# Patient Record
Sex: FEMALE | Race: WHITE | NOT HISPANIC OR LATINO | Employment: FULL TIME | URBAN - METROPOLITAN AREA
[De-identification: names, ages, dates, MRNs, and addresses within clinical notes are randomized per-mention and may not be internally consistent; named-entity substitution may affect disease eponyms.]

---

## 2017-03-02 ENCOUNTER — OFFICE VISIT (OUTPATIENT)
Dept: INTERNAL MEDICINE | Facility: CLINIC | Age: 27
End: 2017-03-02

## 2017-03-02 VITALS
HEART RATE: 62 BPM | BODY MASS INDEX: 20.24 KG/M2 | HEIGHT: 62 IN | DIASTOLIC BLOOD PRESSURE: 60 MMHG | OXYGEN SATURATION: 98 % | WEIGHT: 110 LBS | SYSTOLIC BLOOD PRESSURE: 98 MMHG

## 2017-03-02 DIAGNOSIS — J45.20 MILD INTERMITTENT ASTHMA WITHOUT COMPLICATION: Primary | ICD-10-CM

## 2017-03-02 PROCEDURE — 99213 OFFICE O/P EST LOW 20 MIN: CPT | Performed by: NURSE PRACTITIONER

## 2017-03-02 RX ORDER — LORATADINE 10 MG/1
10 TABLET ORAL DAILY
COMMUNITY
End: 2020-03-16

## 2017-03-02 RX ORDER — MONTELUKAST SODIUM 10 MG/1
TABLET ORAL
Qty: 30 TABLET | Refills: 11 | Status: SHIPPED | OUTPATIENT
Start: 2017-03-02 | End: 2019-01-28 | Stop reason: SDUPTHER

## 2017-03-02 RX ORDER — ALBUTEROL SULFATE 90 UG/1
AEROSOL, METERED RESPIRATORY (INHALATION)
Qty: 18 G | Refills: 3 | Status: SHIPPED | OUTPATIENT
Start: 2017-03-02 | End: 2017-11-16 | Stop reason: SDUPTHER

## 2017-03-02 NOTE — PROGRESS NOTES
"Subjective  Med Refill (inhalers )      Lillie Dos Santos is a 26 y.o. female.   Allergies   Allergen Reactions   • Amoxicillin    • Penicillins      History of Present Illness      Using rescue inhaler since working out and doing a lot of cardio , needs before and after exercising 3 x week , pt does have a wood burning stove and does notice it bothering her asthma at times  The following portions of the patient's history were reviewed and updated as appropriate: allergies, current medications, past family history, past medical history, past social history, past surgical history and problem list.    Review of Systems   Respiratory: Positive for cough and wheezing.    All other systems reviewed and are negative.      Objective   Physical Exam   Constitutional: She is oriented to person, place, and time. She appears well-developed.   HENT:   Head: Normocephalic.   Eyes: Pupils are equal, round, and reactive to light.   Cardiovascular: Normal rate, regular rhythm and normal heart sounds.    Pulmonary/Chest: Effort normal and breath sounds normal.   Neurological: She is alert and oriented to person, place, and time.   Skin: Skin is warm and dry.   Psychiatric: She has a normal mood and affect. Her behavior is normal. Judgment and thought content normal.     Visit Vitals   • BP 98/60   • Pulse 62   • Ht 62\" (157.5 cm)   • Wt 110 lb (49.9 kg)   • SpO2 98%   • BMI 20.12 kg/m2       Assessment/Plan     Problem List Items Addressed This Visit        Respiratory    Asthma - Primary    Relevant Medications    albuterol (PROAIR HFA) 108 (90 BASE) MCG/ACT inhaler    fluticasone-salmeterol (ADVAIR DISKUS) 500-50 MCG/DOSE DISKUS    montelukast (SINGULAIR) 10 MG tablet        I have added the singular since she does have a wood burning stove , increase advair, we have discussed the goals of the rescue inhaler, she will let me know if still using > 2 week , o/w rtc 9/17 cpe, fasting labs       "

## 2017-11-17 RX ORDER — ALBUTEROL SULFATE 90 UG/1
AEROSOL, METERED RESPIRATORY (INHALATION)
Qty: 8 G | Refills: 0 | Status: SHIPPED | OUTPATIENT
Start: 2017-11-17 | End: 2019-01-28 | Stop reason: SDUPTHER

## 2017-11-17 RX ORDER — ALBUTEROL SULFATE 90 UG/1
AEROSOL, METERED RESPIRATORY (INHALATION)
Qty: 8 G | Refills: 0 | Status: SHIPPED | OUTPATIENT
Start: 2017-11-17 | End: 2017-11-17 | Stop reason: SDUPTHER

## 2018-03-21 DIAGNOSIS — J45.20 MILD INTERMITTENT ASTHMA WITHOUT COMPLICATION: ICD-10-CM

## 2018-06-11 DIAGNOSIS — J45.20 MILD INTERMITTENT ASTHMA WITHOUT COMPLICATION: ICD-10-CM

## 2019-01-28 ENCOUNTER — OFFICE VISIT (OUTPATIENT)
Dept: INTERNAL MEDICINE | Facility: CLINIC | Age: 29
End: 2019-01-28

## 2019-01-28 VITALS
BODY MASS INDEX: 21.9 KG/M2 | HEIGHT: 62 IN | OXYGEN SATURATION: 94 % | DIASTOLIC BLOOD PRESSURE: 80 MMHG | SYSTOLIC BLOOD PRESSURE: 120 MMHG | HEART RATE: 100 BPM | TEMPERATURE: 98 F | WEIGHT: 119 LBS

## 2019-01-28 DIAGNOSIS — J45.20 MILD INTERMITTENT ASTHMA WITHOUT COMPLICATION: Primary | ICD-10-CM

## 2019-01-28 DIAGNOSIS — R30.0 BURNING WITH URINATION: ICD-10-CM

## 2019-01-28 LAB
BILIRUB BLD-MCNC: NEGATIVE MG/DL
CLARITY, POC: CLEAR
COLOR UR: YELLOW
GLUCOSE UR STRIP-MCNC: NEGATIVE MG/DL
KETONES UR QL: NEGATIVE
LEUKOCYTE EST, POC: NEGATIVE
NITRITE UR-MCNC: NEGATIVE MG/ML
PH UR: 6 [PH] (ref 5–8)
PROT UR STRIP-MCNC: ABNORMAL MG/DL
RBC # UR STRIP: NEGATIVE /UL
SP GR UR: 1.01 (ref 1–1.03)
UROBILINOGEN UR QL: NORMAL

## 2019-01-28 PROCEDURE — 99213 OFFICE O/P EST LOW 20 MIN: CPT | Performed by: NURSE PRACTITIONER

## 2019-01-28 RX ORDER — ALBUTEROL SULFATE 90 UG/1
2 AEROSOL, METERED RESPIRATORY (INHALATION) EVERY 6 HOURS PRN
Qty: 1 INHALER | Refills: 2 | Status: SHIPPED | OUTPATIENT
Start: 2019-01-28 | End: 2019-09-27 | Stop reason: SDUPTHER

## 2019-01-28 RX ORDER — MONTELUKAST SODIUM 10 MG/1
TABLET ORAL
Qty: 30 TABLET | Refills: 2 | Status: SHIPPED | OUTPATIENT
Start: 2019-01-28 | End: 2020-03-16

## 2019-01-28 NOTE — PROGRESS NOTES
"Subjective   Lillie Dos Santos is a 28 y.o. female.   Chief Complaint   Patient presents with   • Urinary Tract Infection     x2   • Asthma      History of Present Illness Having problems with asthma.  Using albuterol more than usual She is out of [facility] for 2 months.  Nonsmoker.  Has sinus drainage.  Has popping and cracking of ears.  Has scratchy throat.  Has a nonproductive cough.  Some wheezing.  No chest pain, abdominal pain, nausea vomiting or diarrhea.  She has burning when she urinates.  No blood in urine.  No fever or chills.  No vaginal discharge    The following portions of the patient's history were reviewed and updated as appropriate: allergies, current medications, past family history, past medical history, past social history, past surgical history and problem list.    Current Outpatient Medications:   •  albuterol sulfate HFA (VENTOLIN HFA) 108 (90 Base) MCG/ACT inhaler, Inhale 2 puffs Every 6 (Six) Hours As Needed for Wheezing., Disp: 1 inhaler, Rfl: 2  •  loratadine (CLARITIN) 10 MG tablet, Take 10 mg by mouth Daily., Disp: , Rfl:   •  montelukast (SINGULAIR) 10 MG tablet, One PO daily, Disp: 30 tablet, Rfl: 2  •  Fluticasone Furoate-Vilanterol (BREO ELLIPTA) 100-25 MCG/INH inhaler, Inhale 1 puff Daily., Disp: 30 each, Rfl: 2    Review of Systems Consitutional, HEENT, Respiratory, CV, GI, , Skin, Musculoskeletal, Neuro-mental, Endocrinological, Hematological were reviewed.  Positives were discussed in the HPI, otherwise ROS was negative   /80   Pulse 100   Temp 98 °F (36.7 °C)   Ht 157.5 cm (62\")   Wt 54 kg (119 lb)   SpO2 94%   BMI 21.77 kg/m²     Objective   Allergies   Allergen Reactions   • Amoxicillin    • Penicillins        Physical Exam   Constitutional: She is oriented to person, place, and time. She appears well-developed and well-nourished. No distress.   HENT:   Head: Normocephalic.   Right Ear: External ear normal.   Left Ear: External ear normal.   Mouth/Throat: Oropharynx is " clear and moist.   TMs are dull.  Throat with PND, no exudate.  Nontender over sinuses   Eyes: Right eye exhibits no discharge. Left eye exhibits no discharge. No scleral icterus.   Neck: Neck supple.   Cardiovascular: Normal rate, regular rhythm, normal heart sounds and intact distal pulses. Exam reveals no gallop and no friction rub.   No murmur heard.  Pulmonary/Chest: Effort normal and breath sounds normal. No stridor. No respiratory distress. She has no wheezes.   Lymphadenopathy:     She has no cervical adenopathy.   Neurological: She is alert and oriented to person, place, and time.   Skin: Skin is warm and dry. Capillary refill takes less than 2 seconds.   Nursing note and vitals reviewed.      Procedures    LABS  Results for orders placed or performed in visit on 01/28/19   POC Urinalysis Dipstick, Automated   Result Value Ref Range    Color Yellow Yellow, Straw, Dark Yellow, Teresa    Clarity, UA Clear Clear    Specific Gravity  1.015 1.005 - 1.030    pH, Urine 6.0 5.0 - 8.0    Leukocytes Negative Negative    Nitrite, UA Negative Negative    Protein,  mg/dL (A) Negative mg/dL    Glucose, UA Negative Negative, 1000 mg/dL (3+) mg/dL    Ketones, UA Negative Negative    Urobilinogen, UA Normal Normal    Bilirubin Negative Negative    Blood, UA Negative Negative       Assessment/Plan   Lillie was seen today for urinary tract infection and asthma.    Diagnoses and all orders for this visit:    Mild intermittent asthma without complication  -     albuterol sulfate HFA (VENTOLIN HFA) 108 (90 Base) MCG/ACT inhaler; Inhale 2 puffs Every 6 (Six) Hours As Needed for Wheezing.  -     Fluticasone Furoate-Vilanterol (BREO ELLIPTA) 100-25 MCG/INH inhaler; Inhale 1 puff Daily.  -     montelukast (SINGULAIR) 10 MG tablet; One PO daily    Burning with urination  -     POC Urinalysis Dipstick, Automated    Other orders  -     Cancel: fluticasone-salmeterol (ADVAIR DISKUS) 500-50 MCG/DOSE DISKUS;           Patient  Instructions   Drink plenty fluids.  Urine analysis was negative for infection.  Begin Breo as discussed for asthma.  Labs as know if it means to be preauthorized.  Use albuterol 2 puffs every 6 hours as needed for wheezing.  Do not use it more than every 6 hours and she can have a rebound effect.  Antihistamine of choice.  Return to the clinic, If you are not improving. Pt verbalizes understanding and agreement with plan of care.   EMR Dragon/transcription disclaimer:  Please note that portions of this note were completed with a voice recognition program.  Electronic transcription of the voice recognition program may permit erroneous words or phrases to be inadvertently transcribed.  Although I have reviewed the note for such errors, some may still exist in this documentation     Kae Topete, JENNIFER

## 2019-01-30 NOTE — PATIENT INSTRUCTIONS
Drink plenty fluids.  Urine analysis was negative for infection.  Begin Breo as discussed for asthma.  Labs as know if it means to be preauthorized.  Use albuterol 2 puffs every 6 hours as needed for wheezing.  Do not use it more than every 6 hours and she can have a rebound effect.  Antihistamine of choice.  Return to the clinic, If you are not improving. Pt verbalizes understanding and agreement with plan of care.

## 2019-01-31 DIAGNOSIS — J45.20 MILD INTERMITTENT ASTHMA WITHOUT COMPLICATION: Primary | ICD-10-CM

## 2019-01-31 RX ORDER — BUDESONIDE AND FORMOTEROL FUMARATE DIHYDRATE 160; 4.5 UG/1; UG/1
2 AEROSOL RESPIRATORY (INHALATION)
Qty: 1 INHALER | Refills: 2 | Status: SHIPPED | OUTPATIENT
Start: 2019-01-31 | End: 2020-03-16 | Stop reason: SDUPTHER

## 2019-02-08 ENCOUNTER — PRIOR AUTHORIZATION (OUTPATIENT)
Dept: INTERNAL MEDICINE | Facility: CLINIC | Age: 29
End: 2019-02-08

## 2019-02-26 ENCOUNTER — PRIOR AUTHORIZATION (OUTPATIENT)
Dept: INTERNAL MEDICINE | Facility: CLINIC | Age: 29
End: 2019-02-26

## 2019-08-28 ENCOUNTER — PRIOR AUTHORIZATION (OUTPATIENT)
Dept: INTERNAL MEDICINE | Facility: CLINIC | Age: 29
End: 2019-08-28

## 2019-09-19 ENCOUNTER — TELEPHONE (OUTPATIENT)
Dept: INTERNAL MEDICINE | Facility: CLINIC | Age: 29
End: 2019-09-19

## 2019-09-27 DIAGNOSIS — J45.20 MILD INTERMITTENT ASTHMA WITHOUT COMPLICATION: ICD-10-CM

## 2019-09-27 RX ORDER — ALBUTEROL SULFATE 90 UG/1
2 AEROSOL, METERED RESPIRATORY (INHALATION) EVERY 6 HOURS PRN
Qty: 1 INHALER | Refills: 2 | Status: CANCELLED | OUTPATIENT
Start: 2019-09-27

## 2019-09-27 RX ORDER — ALBUTEROL SULFATE 90 UG/1
2 AEROSOL, METERED RESPIRATORY (INHALATION) EVERY 6 HOURS PRN
Qty: 1 INHALER | Refills: 0 | Status: SHIPPED | OUTPATIENT
Start: 2019-09-27 | End: 2020-03-16 | Stop reason: SDUPTHER

## 2019-09-27 NOTE — TELEPHONE ENCOUNTER
Needs appt as she has not been here since in greater than 6 months.  I will send in 1 month of albuterol

## 2019-09-27 NOTE — TELEPHONE ENCOUNTER
PT CALLED TO SAY THAT THE SYMBICORT INHALER IS NOT COVERED AND SHE HAS BEEN HAVING TO USE THE RESCUE INHALER MORE AND IS NOW OUT OF THIS   CAN WE CALL SOMETHING ELSE IN FOR THE SYMBICORT?    PLEASE CALL [phone]

## 2019-10-02 ENCOUNTER — PRIOR AUTHORIZATION (OUTPATIENT)
Dept: INTERNAL MEDICINE | Facility: CLINIC | Age: 29
End: 2019-10-02

## 2020-02-19 ENCOUNTER — TELEPHONE (OUTPATIENT)
Dept: INTERNAL MEDICINE | Facility: CLINIC | Age: 30
End: 2020-02-19

## 2020-02-19 NOTE — TELEPHONE ENCOUNTER
VMAILBOX FULL, CALLING TO EST CARE WITH A DIFF PROVIDER AS A NEW PT, LAST PROVIDER LE WAYNE IS NO LONGER IN THIS PRACTICE

## 2020-03-13 ENCOUNTER — NURSE TRIAGE (OUTPATIENT)
Dept: CALL CENTER | Facility: HOSPITAL | Age: 30
End: 2020-03-13

## 2020-03-13 NOTE — TELEPHONE ENCOUNTER
Patient is  Requesting to be seen. Patient is out of her inhalers. Has taken a neb treatment. Will be going to urgent care today. Has an appointment with provider 3/16.    Reason for Disposition  • [1] MILD asthma attack (e.g., no SOB at rest, mild SOB with walking, speaks normally in sentences, mild wheezing) AND [2]  persists > 24 hours on appropriate treatment    Additional Information  • Negative: Severe difficulty breathing (e.g., struggling for each breath, speak in single words, pulse > 120)  • Negative: Bluish (or gray) lips or face now  • Negative: Difficult to awaken or acting confused (e.g., disoriented, slurred speech)  • Negative: Passed out (i.e., lost consciousness, collapsed and was not responding)  • Negative: Wheezing started suddenly after medicine, an allergic food, or bee sting  • Negative: Sounds like a life-threatening emergency to the triager  • Negative: [1] SEVERE asthma attack (e.g., very SOB at rest, speaks in single words, loud wheezes) AND [2] not resolved after 2 nebulizer or inhaler treatments  • Negative: Peak flow rate less than 50% of baseline level (RED zone)  • Negative: [1] Severe wheezing or coughing AND [2] doesn't have neb or inhaler available  • Negative: Chest pain  • Negative: [1] Hospitalized before with asthma AND [2] feels the same now  • Negative: [1] MODERATE asthma attack (e.g., SOB at rest, speaks in phrases, audible wheezes) AND [2] not resolved after 2 nebulizer or inhaler treatments given 20 minutes apart  • Negative: [1] Peak flow rate 50-80% of baseline level (YELLOW zone) AND [2] not resolved after 2 nebulizer or inhaler treatments given 20 minutes apart  • Negative: Asthma medicine (nebulizer or inhaler) is needed more frequently than q 4 hours to keep you comfortable  • Negative: Fever > 103 F (39.4 C)  • Negative: [1] Fever > 101 F (38.3 C) AND [2] age > 60  • Negative: Patient sounds very sick or weak to the triager  • Negative: [1] Continuous (nonstop)  "coughing AND [2] keeps from working or sleeping AND [3] not improved after 2 nebulizer or inhaler treatments given 20 minutes apart  • Negative: [1] Wheezing or coughing AND [2] hasn't used neb or inhaler twice AND [3] it's available  • Negative: [1] Influenza prevalent in community (or household) AND [2] flu symptoms (e.g., cough WITH fever, etc) AND [3] onset < 48 hours ago    Answer Assessment - Initial Assessment Questions  1. RESPIRATORY STATUS: \"Describe your breathing?\" (e.g., wheezing, shortness of breath, unable to speak, severe coughing)       Short of air , but has taken a breathing rx  2. ONSET: \"When did this asthma attack begin?\"       3 days  3. TRIGGER: \"What do you think triggered this attack?\" (e.g., URI, exposure to pollen or other allergen, tobacco smoke)       Out of her inhalers  4. PEAK EXPIRATORY FLOW RATE (PEFR): \"Do you use a peak flow meter?\" If so, ask: \"What's the current peak flow? What's your personal best peak flow?\"       na  5. SEVERITY: \"How bad is this attack?\"     - MILD: No SOB at rest, mild SOB with walking, speaks normally in sentences, can lay down, no retractions, pulse < 100. (GREEN Zone: PEFR %)    - MODERATE: SOB at rest, SOB with minimal exertion and prefers to sit, cannot lie down flat, speaks in phrases, mild retractions, audible wheezing, pulse 100-120. (YELLOW Zone: PEFR 50-80%)     - SEVERE: Very SOB at rest, speaks in single words, struggling to breathe, sitting hunched forward, retractions, usually loud wheezing, sometimes minimal wheezing because of decreased air movement, pulse > 120. (RED Zone: PEFR < 50%).       Mild , has just had a breathing treatment  6. MEDICATIONS (Inhaler or nebs): \"What are your asthma medications?\" and \"What treatments have you given so far?\"     - Quick-relief: albuterol, metaproterenol, salbutamol, or other inhaled or nebulized beta-agonist medicines    - Long-term-control: steroids, cromolyn, or other anti-inflammatory " "medicines.      Patient is out of her inhaler  7. OTHER SYMPTOMS: \"Do you have any other symptoms? (e.g., runny nose, chest pain, fever)     no  8. PREGNANCY: \"Is there any chance you are pregnant?\" \"When was your last menstrual period?\"      no    Protocols used: ASTHMA ATTACK-ADULT-AH      "

## 2020-03-16 ENCOUNTER — LAB (OUTPATIENT)
Dept: LAB | Facility: HOSPITAL | Age: 30
End: 2020-03-16

## 2020-03-16 ENCOUNTER — OFFICE VISIT (OUTPATIENT)
Dept: INTERNAL MEDICINE | Facility: CLINIC | Age: 30
End: 2020-03-16

## 2020-03-16 VITALS
BODY MASS INDEX: 22.6 KG/M2 | RESPIRATION RATE: 16 BRPM | TEMPERATURE: 97.7 F | WEIGHT: 122.8 LBS | SYSTOLIC BLOOD PRESSURE: 118 MMHG | DIASTOLIC BLOOD PRESSURE: 64 MMHG | OXYGEN SATURATION: 98 % | HEIGHT: 62 IN | HEART RATE: 101 BPM

## 2020-03-16 DIAGNOSIS — R73.9 HYPERGLYCEMIA: ICD-10-CM

## 2020-03-16 DIAGNOSIS — Z3A.01 LESS THAN 8 WEEKS GESTATION OF PREGNANCY: ICD-10-CM

## 2020-03-16 DIAGNOSIS — Z00.00 HEALTHCARE MAINTENANCE: ICD-10-CM

## 2020-03-16 DIAGNOSIS — J45.21 MILD INTERMITTENT ASTHMA WITH ACUTE EXACERBATION: Primary | ICD-10-CM

## 2020-03-16 LAB
ALBUMIN SERPL-MCNC: 4.7 G/DL (ref 3.5–5.2)
ALBUMIN/GLOB SERPL: 1.8 G/DL
ALP SERPL-CCNC: 45 U/L (ref 39–117)
ALT SERPL W P-5'-P-CCNC: 7 U/L (ref 1–33)
ANION GAP SERPL CALCULATED.3IONS-SCNC: 15.6 MMOL/L (ref 5–15)
AST SERPL-CCNC: 14 U/L (ref 1–32)
BILIRUB SERPL-MCNC: 0.3 MG/DL (ref 0.2–1.2)
BUN BLD-MCNC: 10 MG/DL (ref 6–20)
BUN/CREAT SERPL: 11.9 (ref 7–25)
CALCIUM SPEC-SCNC: 9.8 MG/DL (ref 8.6–10.5)
CHLORIDE SERPL-SCNC: 101 MMOL/L (ref 98–107)
CO2 SERPL-SCNC: 20.4 MMOL/L (ref 22–29)
CREAT BLD-MCNC: 0.84 MG/DL (ref 0.57–1)
DEPRECATED RDW RBC AUTO: 38.7 FL (ref 37–54)
ERYTHROCYTE [DISTWIDTH] IN BLOOD BY AUTOMATED COUNT: 12.2 % (ref 12.3–15.4)
GFR SERPL CREATININE-BSD FRML MDRD: 81 ML/MIN/1.73
GLOBULIN UR ELPH-MCNC: 2.6 GM/DL
GLUCOSE BLD-MCNC: 163 MG/DL (ref 65–99)
HCT VFR BLD AUTO: 36.9 % (ref 34–46.6)
HGB BLD-MCNC: 12.2 G/DL (ref 12–15.9)
MCH RBC QN AUTO: 28.8 PG (ref 26.6–33)
MCHC RBC AUTO-ENTMCNC: 33.1 G/DL (ref 31.5–35.7)
MCV RBC AUTO: 87.2 FL (ref 79–97)
PLATELET # BLD AUTO: 365 10*3/MM3 (ref 140–450)
PMV BLD AUTO: 10 FL (ref 6–12)
POTASSIUM BLD-SCNC: 4.1 MMOL/L (ref 3.5–5.2)
PROT SERPL-MCNC: 7.3 G/DL (ref 6–8.5)
RBC # BLD AUTO: 4.23 10*6/MM3 (ref 3.77–5.28)
SODIUM BLD-SCNC: 137 MMOL/L (ref 136–145)
WBC NRBC COR # BLD: 13.32 10*3/MM3 (ref 3.4–10.8)

## 2020-03-16 PROCEDURE — 80053 COMPREHEN METABOLIC PANEL: CPT | Performed by: INTERNAL MEDICINE

## 2020-03-16 PROCEDURE — 83036 HEMOGLOBIN GLYCOSYLATED A1C: CPT

## 2020-03-16 PROCEDURE — 85027 COMPLETE CBC AUTOMATED: CPT | Performed by: INTERNAL MEDICINE

## 2020-03-16 PROCEDURE — 99213 OFFICE O/P EST LOW 20 MIN: CPT | Performed by: INTERNAL MEDICINE

## 2020-03-16 RX ORDER — BUDESONIDE AND FORMOTEROL FUMARATE DIHYDRATE 160; 4.5 UG/1; UG/1
2 AEROSOL RESPIRATORY (INHALATION)
Qty: 1 INHALER | Refills: 5 | Status: SHIPPED | OUTPATIENT
Start: 2020-03-16 | End: 2021-03-22

## 2020-03-16 RX ORDER — ALBUTEROL SULFATE 90 UG/1
2 AEROSOL, METERED RESPIRATORY (INHALATION) EVERY 6 HOURS PRN
Qty: 1 INHALER | Refills: 5 | Status: SHIPPED | OUTPATIENT
Start: 2020-03-16 | End: 2021-03-22

## 2020-03-16 RX ORDER — PREDNISONE 10 MG/1
TABLET ORAL
COMMUNITY
Start: 2020-01-15 | End: 2020-11-06 | Stop reason: HOSPADM

## 2020-03-16 RX ORDER — BROMPHENIRAMINE MALEATE, PSEUDOEPHEDRINE HYDROCHLORIDE, AND DEXTROMETHORPHAN HYDROBROMIDE 2; 30; 10 MG/5ML; MG/5ML; MG/5ML
SYRUP ORAL
COMMUNITY
Start: 2020-01-15 | End: 2020-03-16

## 2020-03-16 NOTE — PROGRESS NOTES
Internal Medicine New Patient  Lillie Dos Santos is a 29 y.o. female who presents today to establish care and with concerns as outlined below.    Chief Complaint  Chief Complaint   Patient presents with   • Establish Care   • Med Refill     Inhalers for asthma        HPI  Ms. Dos Santos comes in today to establish care. She previously reports seeing many different doctors when we were located across the street. She was a patient of [facility].    Asthma - She notes having asthma since age 13 and she takes symbicort BID and albuterol as needed. She ran out of symbicort a week and a half to two weeks ago. She has also been using an old rescue inhaler and albuterol nebulizer after running out of this as well. She was seen at  with [facility] yesterday due to SOA, chest tightness despite neb treatments at home. She states that she was given steroids via IV, breathing treatment and was sent home with 3 day script for oral prednisone. She has not had fever. She is otherwise feeling well.    She notes a positive pregnancy test last week. Her LMP was 2/1. She was trying to conceive. She was high risk with her prior pregnancy due to her asthma. She is looking into a new OB-GYN. She has not started a prenatal vitamin.       Review of Systems  Review of Systems   Constitutional: Negative.    Respiratory: Positive for cough, chest tightness, shortness of breath and wheezing.    Cardiovascular: Negative.    Gastrointestinal: Negative.    Genitourinary:        Pregnant   Musculoskeletal: Negative.    Skin: Negative.    Neurological: Negative.    Psychiatric/Behavioral: Negative.         Past Medical History  Past Medical History:   Diagnosis Date   • Asthma    • Esophagitis, reflux    • Headache    • UTI (urinary tract infection)         Surgical History  Past Surgical History:   Procedure Laterality Date   • DENTAL PROCEDURE     • FINGER SURGERY Right     forefinger        Family History  Family History   Problem Relation Age of Onset   • ALS  "Father    • Hyperlipidemia Other    • Hypertension Other    • Melanoma Other         Social History  Social History     Socioeconomic History   • Marital status: Single     Spouse name: Not on file   • Number of children: Not on file   • Years of education: Not on file   • Highest education level: Not on file   Tobacco Use   • Smoking status: Former Smoker   • Smokeless tobacco: Never Used   Substance and Sexual Activity   • Alcohol use: Yes   • Drug use: No   • Sexual activity: Defer        Current Medications  Current Outpatient Medications on File Prior to Visit   Medication Sig Dispense Refill   • albuterol sulfate HFA (VENTOLIN HFA) 108 (90 Base) MCG/ACT inhaler Inhale 2 puffs Every 6 (Six) Hours As Needed for Wheezing. 1 inhaler 0   • brompheniramine-pseudoephedrine-DM 30-2-10 MG/5ML syrup      • budesonide-formoterol (SYMBICORT) 160-4.5 MCG/ACT inhaler Inhale 2 puffs 2 (Two) Times a Day. 1 inhaler 2   • Cetirizine HCl (ZYRTEC ALLERGY) 10 MG capsule Take  by mouth.     • predniSONE (DELTASONE) 10 MG tablet      • [DISCONTINUED] loratadine (CLARITIN) 10 MG tablet Take 10 mg by mouth Daily.     • [DISCONTINUED] montelukast (SINGULAIR) 10 MG tablet One PO daily 30 tablet 2     No current facility-administered medications on file prior to visit.        Allergies  Allergies   Allergen Reactions   • Amoxicillin GI Intolerance   • Penicillins GI Intolerance        Objective  Visit Vitals  /64   Pulse 101   Temp 97.7 °F (36.5 °C)   Resp 16   Ht 157.5 cm (62.01\")   Wt 55.7 kg (122 lb 12.8 oz)   SpO2 98%   BMI 22.45 kg/m²        Physical Exam  Physical Exam   Constitutional: She is oriented to person, place, and time. She appears well-developed and well-nourished. No distress.   HENT:   Head: Normocephalic and atraumatic.   Right Ear: External ear normal.   Left Ear: External ear normal.   Nose: Nose normal.   Mouth/Throat: Oropharynx is clear and moist. No oropharyngeal exudate.   Eyes: Pupils are equal, round, " and reactive to light. Conjunctivae and EOM are normal. No scleral icterus.   Neck: Neck supple.   Cardiovascular: Normal rate, regular rhythm, normal heart sounds and intact distal pulses.   No murmur heard.  Pulmonary/Chest: Effort normal and breath sounds normal. No respiratory distress. She has no wheezes.   Abdominal: Soft. Bowel sounds are normal. She exhibits no distension. There is no tenderness.   Musculoskeletal: She exhibits no edema or deformity.   Lymphadenopathy:     She has no cervical adenopathy.   Neurological: She is alert and oriented to person, place, and time.   Skin: Skin is warm and dry. No rash noted. She is not diaphoretic.   Psychiatric: She has a normal mood and affect. Her behavior is normal. Judgment and thought content normal.   Nursing note and vitals reviewed.       Results  Results for orders placed or performed in visit on 01/28/19   POC Urinalysis Dipstick, Automated   Result Value Ref Range    Color Yellow Yellow, Straw, Dark Yellow, Teresa    Clarity, UA Clear Clear    Specific Gravity  1.015 1.005 - 1.030    pH, Urine 6.0 5.0 - 8.0    Leukocytes Negative Negative    Nitrite, UA Negative Negative    Protein,  mg/dL (A) Negative mg/dL    Glucose, UA Negative Negative, 1000 mg/dL (3+) mg/dL    Ketones, UA Negative Negative    Urobilinogen, UA Normal Normal    Bilirubin Negative Negative    Blood, UA Negative Negative        Assessment and Plan  Lillie was seen today for establish care and med refill.    Diagnoses and all orders for this visit:    Mild intermittent asthma with acute exacerbation  - Asthma typically well controlled on symbicort BID with PRN albuterol however after running out of controller inhalers recently developed increasing SOA, wheezing, chest tightness and has been treated for asthma exacerbation with steroid injection and short course of oral steroids with improvement. Lungs clear today, no respiratory distress.  - Inhalers refilled today.    Less than 8  weeks gestation of pregnancy  - She reports LMP 2/1 with home pregnancy test positive. She declined confirmatory testing in office today.  - She also declined referral to OB and will set up her own appointment.    Healthcare maintenance  - CMP and CBC ordered    Health Maintenance   Topic Date Due   • ANNUAL PHYSICAL  10/30/1995   • TDAP/TD VACCINES (1 - Tdap) 10/30/2003   • PAP SMEAR  09/06/2016   • INFLUENZA VACCINE  08/01/2019     Health Maintenance  - Pap smear: pap last summer was normal at [facility]. Records requested.  - Mammogram: Start screening at age 40.  - Colonoscopy: Start screening at age 50.  - Immunizations: Flu UTD.   - Depression screening: negative 3/2020    Return in about 6 months (around 9/16/2020) for Follow up, Labs today.

## 2020-03-17 DIAGNOSIS — R73.9 HYPERGLYCEMIA: Primary | ICD-10-CM

## 2020-03-17 PROBLEM — Z3A.01 LESS THAN 8 WEEKS GESTATION OF PREGNANCY: Status: ACTIVE | Noted: 2020-03-17

## 2020-03-17 LAB — HBA1C MFR BLD: 5.1 % (ref 4.8–5.6)

## 2020-03-24 ENCOUNTER — TELEPHONE (OUTPATIENT)
Dept: INTERNAL MEDICINE | Facility: CLINIC | Age: 30
End: 2020-03-24

## 2020-03-24 NOTE — TELEPHONE ENCOUNTER
Patient states that she has extreme nausea, abdominal pain, chills, llightheaded.  She is going to an Ob-Gyn for the first time on Monday, but was wandering if there is something Dr. Duval could do, maybe call her in something.  Please advise.  She can be reached at [phone]

## 2020-03-25 NOTE — TELEPHONE ENCOUNTER
If she has continued to have severe abdominal pain and nausea she needs to call her OB and let her know that she is having these symptoms and if they are unable to see her today she needs to be seen in the ED. She must be evaluated.

## 2020-03-27 ENCOUNTER — TELEPHONE (OUTPATIENT)
Dept: OBSTETRICS AND GYNECOLOGY | Facility: CLINIC | Age: 30
End: 2020-03-27

## 2020-03-30 ENCOUNTER — TELEPHONE (OUTPATIENT)
Dept: OBSTETRICS AND GYNECOLOGY | Facility: CLINIC | Age: 30
End: 2020-03-30

## 2020-05-04 ENCOUNTER — LAB (OUTPATIENT)
Dept: LAB | Facility: HOSPITAL | Age: 30
End: 2020-05-04

## 2020-05-04 ENCOUNTER — LAB REQUISITION (OUTPATIENT)
Dept: LAB | Facility: HOSPITAL | Age: 30
End: 2020-05-04

## 2020-05-04 ENCOUNTER — TRANSCRIBE ORDERS (OUTPATIENT)
Dept: LAB | Facility: HOSPITAL | Age: 30
End: 2020-05-04

## 2020-05-04 DIAGNOSIS — Z34.81 PRENATAL CARE, SUBSEQUENT PREGNANCY, FIRST TRIMESTER: ICD-10-CM

## 2020-05-04 DIAGNOSIS — Z34.81 PRENATAL CARE, SUBSEQUENT PREGNANCY, FIRST TRIMESTER: Primary | ICD-10-CM

## 2020-05-04 DIAGNOSIS — Z00.00 ROUTINE GENERAL MEDICAL EXAMINATION AT A HEALTH CARE FACILITY: ICD-10-CM

## 2020-05-04 LAB
ABO GROUP BLD: NORMAL
AMPHET+METHAMPHET UR QL: NEGATIVE
AMPHETAMINES UR QL: NEGATIVE
BARBITURATES UR QL SCN: NEGATIVE
BASOPHILS # BLD AUTO: 0.05 10*3/MM3 (ref 0–0.2)
BASOPHILS NFR BLD AUTO: 0.5 % (ref 0–1.5)
BENZODIAZ UR QL SCN: NEGATIVE
BILIRUB UR QL STRIP: NEGATIVE
BLD GP AB SCN SERPL QL: NEGATIVE
BUPRENORPHINE SERPL-MCNC: NEGATIVE NG/ML
CANNABINOIDS SERPL QL: NEGATIVE
CLARITY UR: CLEAR
COCAINE UR QL: NEGATIVE
COLOR UR: YELLOW
DEPRECATED RDW RBC AUTO: 40.1 FL (ref 37–54)
EOSINOPHIL # BLD AUTO: 0.49 10*3/MM3 (ref 0–0.4)
EOSINOPHIL NFR BLD AUTO: 5.2 % (ref 0.3–6.2)
ERYTHROCYTE [DISTWIDTH] IN BLOOD BY AUTOMATED COUNT: 12.5 % (ref 12.3–15.4)
GLUCOSE BLD-MCNC: 71 MG/DL (ref 65–99)
GLUCOSE UR STRIP-MCNC: NEGATIVE MG/DL
HBV SURFACE AG SERPL QL IA: NORMAL
HCT VFR BLD AUTO: 33 % (ref 34–46.6)
HCV AB SER DONR QL: NORMAL
HGB BLD-MCNC: 11.2 G/DL (ref 12–15.9)
HGB UR QL STRIP.AUTO: NEGATIVE
HIV1+2 AB SER QL: NORMAL
IMM GRANULOCYTES # BLD AUTO: 0.05 10*3/MM3 (ref 0–0.05)
IMM GRANULOCYTES NFR BLD AUTO: 0.5 % (ref 0–0.5)
KETONES UR QL STRIP: NEGATIVE
LEUKOCYTE ESTERASE UR QL STRIP.AUTO: NEGATIVE
LYMPHOCYTES # BLD AUTO: 1.26 10*3/MM3 (ref 0.7–3.1)
LYMPHOCYTES NFR BLD AUTO: 13.3 % (ref 19.6–45.3)
MCH RBC QN AUTO: 29.9 PG (ref 26.6–33)
MCHC RBC AUTO-ENTMCNC: 33.9 G/DL (ref 31.5–35.7)
MCV RBC AUTO: 88.2 FL (ref 79–97)
METHADONE UR QL SCN: NEGATIVE
MONOCYTES # BLD AUTO: 0.61 10*3/MM3 (ref 0.1–0.9)
MONOCYTES NFR BLD AUTO: 6.4 % (ref 5–12)
NEUTROPHILS # BLD AUTO: 7.02 10*3/MM3 (ref 1.7–7)
NEUTROPHILS NFR BLD AUTO: 74.1 % (ref 42.7–76)
NITRITE UR QL STRIP: NEGATIVE
NRBC BLD AUTO-RTO: 0 /100 WBC (ref 0–0.2)
OPIATES UR QL: NEGATIVE
OXYCODONE UR QL SCN: NEGATIVE
PCP UR QL SCN: NEGATIVE
PH UR STRIP.AUTO: 8 [PH] (ref 5–8)
PLATELET # BLD AUTO: 274 10*3/MM3 (ref 140–450)
PMV BLD AUTO: 10 FL (ref 6–12)
PROPOXYPH UR QL: NEGATIVE
PROT UR QL STRIP: NEGATIVE
RBC # BLD AUTO: 3.74 10*6/MM3 (ref 3.77–5.28)
RH BLD: POSITIVE
SP GR UR STRIP: 1.02 (ref 1–1.03)
TRICYCLICS UR QL SCN: NEGATIVE
UROBILINOGEN UR QL STRIP: NORMAL
WBC NRBC COR # BLD: 9.48 10*3/MM3 (ref 3.4–10.8)

## 2020-05-04 PROCEDURE — 86901 BLOOD TYPING SEROLOGIC RH(D): CPT

## 2020-05-04 PROCEDURE — 80306 DRUG TEST PRSMV INSTRMNT: CPT

## 2020-05-04 PROCEDURE — 85025 COMPLETE CBC W/AUTO DIFF WBC: CPT

## 2020-05-04 PROCEDURE — 86803 HEPATITIS C AB TEST: CPT

## 2020-05-04 PROCEDURE — 86900 BLOOD TYPING SEROLOGIC ABO: CPT

## 2020-05-04 PROCEDURE — 80081 OBSTETRIC PANEL INC HIV TSTG: CPT

## 2020-05-04 PROCEDURE — 86850 RBC ANTIBODY SCREEN: CPT

## 2020-05-04 PROCEDURE — 82947 ASSAY GLUCOSE BLOOD QUANT: CPT

## 2020-05-04 PROCEDURE — 36415 COLL VENOUS BLD VENIPUNCTURE: CPT

## 2020-05-04 PROCEDURE — 81003 URINALYSIS AUTO W/O SCOPE: CPT

## 2020-05-05 LAB
HOLD SPECIMEN: NORMAL
RPR SER QL: NORMAL
RUBV IGG SERPL IA-ACNC: POSITIVE

## 2020-08-24 ENCOUNTER — LAB (OUTPATIENT)
Dept: LAB | Facility: HOSPITAL | Age: 30
End: 2020-08-24

## 2020-08-24 ENCOUNTER — TRANSCRIBE ORDERS (OUTPATIENT)
Dept: LAB | Facility: HOSPITAL | Age: 30
End: 2020-08-24

## 2020-08-24 DIAGNOSIS — Z34.83 PRENATAL CARE, SUBSEQUENT PREGNANCY, THIRD TRIMESTER: ICD-10-CM

## 2020-08-24 DIAGNOSIS — Z3A.28 28 WEEKS GESTATION OF PREGNANCY: ICD-10-CM

## 2020-08-24 DIAGNOSIS — Z3A.28 28 WEEKS GESTATION OF PREGNANCY: Primary | ICD-10-CM

## 2020-08-24 LAB
BLD GP AB SCN SERPL QL: NEGATIVE
DEPRECATED RDW RBC AUTO: 44.1 FL (ref 37–54)
ERYTHROCYTE [DISTWIDTH] IN BLOOD BY AUTOMATED COUNT: 12.3 % (ref 12.3–15.4)
GLUCOSE 1H P 100 G GLC PO SERPL-MCNC: 91 MG/DL (ref 65–140)
HCT VFR BLD AUTO: 31.4 % (ref 34–46.6)
HGB BLD-MCNC: 10.4 G/DL (ref 12–15.9)
MCH RBC QN AUTO: 31.9 PG (ref 26.6–33)
MCHC RBC AUTO-ENTMCNC: 33.1 G/DL (ref 31.5–35.7)
MCV RBC AUTO: 96.3 FL (ref 79–97)
PLATELET # BLD AUTO: 240 10*3/MM3 (ref 140–450)
PMV BLD AUTO: 10.1 FL (ref 6–12)
RBC # BLD AUTO: 3.26 10*6/MM3 (ref 3.77–5.28)
WBC # BLD AUTO: 11.89 10*3/MM3 (ref 3.4–10.8)

## 2020-08-24 PROCEDURE — 82950 GLUCOSE TEST: CPT

## 2020-08-24 PROCEDURE — 85027 COMPLETE CBC AUTOMATED: CPT

## 2020-08-24 PROCEDURE — 36415 COLL VENOUS BLD VENIPUNCTURE: CPT

## 2020-08-24 PROCEDURE — 86850 RBC ANTIBODY SCREEN: CPT

## 2020-09-14 ENCOUNTER — OFFICE VISIT (OUTPATIENT)
Dept: INTERNAL MEDICINE | Facility: CLINIC | Age: 30
End: 2020-09-14

## 2020-09-14 VITALS
WEIGHT: 139.2 LBS | HEART RATE: 98 BPM | OXYGEN SATURATION: 98 % | BODY MASS INDEX: 25.45 KG/M2 | SYSTOLIC BLOOD PRESSURE: 120 MMHG | DIASTOLIC BLOOD PRESSURE: 70 MMHG

## 2020-09-14 DIAGNOSIS — J45.21 MILD INTERMITTENT ASTHMA WITH ACUTE EXACERBATION: Primary | ICD-10-CM

## 2020-09-14 DIAGNOSIS — Z34.93 THIRD TRIMESTER PREGNANCY: ICD-10-CM

## 2020-09-14 DIAGNOSIS — Z23 NEED FOR VACCINATION: ICD-10-CM

## 2020-09-14 PROCEDURE — 90715 TDAP VACCINE 7 YRS/> IM: CPT | Performed by: INTERNAL MEDICINE

## 2020-09-14 PROCEDURE — 90686 IIV4 VACC NO PRSV 0.5 ML IM: CPT | Performed by: INTERNAL MEDICINE

## 2020-09-14 PROCEDURE — 90472 IMMUNIZATION ADMIN EACH ADD: CPT | Performed by: INTERNAL MEDICINE

## 2020-09-14 PROCEDURE — 90471 IMMUNIZATION ADMIN: CPT | Performed by: INTERNAL MEDICINE

## 2020-09-14 PROCEDURE — 99213 OFFICE O/P EST LOW 20 MIN: CPT | Performed by: INTERNAL MEDICINE

## 2020-09-14 RX ORDER — FERROUS SULFATE 325(65) MG
TABLET ORAL
COMMUNITY
Start: 2020-08-25 | End: 2021-03-08

## 2020-09-14 NOTE — PROGRESS NOTES
Internal Medicine Follow Up    Chief Complaint  Lillie Dos Santos is a 29 y.o. female who presents today for follow up of chronic medical conditions outlined below.    Chief Complaint   Patient presents with   • Asthma        HPI  Ms. Dos Santos comes in today for follow up. She is approx. 32 weeks pregnant. She is due to have her baby boy November 7. Her GYN is Dr. Smyth. She is doing well from a pregnancy standpoint. She is taking iron supplement. No BP issues. She does have difficulty with BM, straining. No hard stools. No diarrhea. She needs Tdap and flu. She is also doing well on symbicort BID with minimal use of albuterol inhaler.       Review of Systems  Review of Systems   Constitutional: Negative.    Respiratory: Negative for shortness of breath and wheezing.    Gastrointestinal: Negative for abdominal pain, constipation, diarrhea, nausea and vomiting.   Genitourinary: Negative.         Current Medications  Current Outpatient Medications on File Prior to Visit   Medication Sig Dispense Refill   • albuterol sulfate HFA (VENTOLIN HFA) 108 (90 Base) MCG/ACT inhaler Inhale 2 puffs Every 6 (Six) Hours As Needed for Wheezing or Shortness of Air. 1 inhaler 5   • budesonide-formoterol (SYMBICORT) 160-4.5 MCG/ACT inhaler Inhale 2 puffs 2 (Two) Times a Day. 1 inhaler 5   • Cetirizine HCl (ZYRTEC ALLERGY) 10 MG capsule Take  by mouth.     • ferrous sulfate 325 (65 FE) MG tablet      • predniSONE (DELTASONE) 10 MG tablet        No current facility-administered medications on file prior to visit.        Allergies  Allergies   Allergen Reactions   • Amoxicillin GI Intolerance   • Penicillins GI Intolerance       Objective  Visit Vitals  /70   Pulse 98   Wt 63.1 kg (139 lb 3.2 oz)   SpO2 98%   Breastfeeding No   BMI 25.45 kg/m²        Physical Exam  Physical Exam  Vitals signs and nursing note reviewed.   Constitutional:       General: She is not in acute distress.     Appearance: Normal appearance. She is well-developed.    HENT:      Head: Normocephalic and atraumatic.   Eyes:      Conjunctiva/sclera: Conjunctivae normal.   Cardiovascular:      Rate and Rhythm: Normal rate and regular rhythm.      Heart sounds: Normal heart sounds.   Pulmonary:      Effort: Pulmonary effort is normal. No respiratory distress.      Breath sounds: Normal breath sounds. No wheezing.   Abdominal:      General: There is distension (gravid).   Musculoskeletal:      Right lower leg: No edema.      Left lower leg: No edema.   Skin:     General: Skin is warm and dry.   Neurological:      Mental Status: She is alert and oriented to person, place, and time.   Psychiatric:         Mood and Affect: Mood normal.         Behavior: Behavior normal.         Thought Content: Thought content normal.         Judgment: Judgment normal.         Results  Results for orders placed or performed in visit on 08/24/20   Glucose, Post 50 Gm Glucola    Specimen: Blood   Result Value Ref Range    Gestational GCT 91 65 - 140 mg/dL   CBC (No Diff)    Specimen: Blood   Result Value Ref Range    WBC 11.89 (H) 3.40 - 10.80 10*3/mm3    RBC 3.26 (L) 3.77 - 5.28 10*6/mm3    Hemoglobin 10.4 (L) 12.0 - 15.9 g/dL    Hematocrit 31.4 (L) 34.0 - 46.6 %    MCV 96.3 79.0 - 97.0 fL    MCH 31.9 26.6 - 33.0 pg    MCHC 33.1 31.5 - 35.7 g/dL    RDW 12.3 12.3 - 15.4 %    RDW-SD 44.1 37.0 - 54.0 fl    MPV 10.1 6.0 - 12.0 fL    Platelets 240 140 - 450 10*3/mm3   Antibody Screen    Specimen: Blood   Result Value Ref Range    Antibody Screen Negative         Assessment and Plan  Lillie was seen today for asthma.    Diagnoses and all orders for this visit:    Mild intermittent asthma with acute exacerbation  - On symbicort with rare albuterol use, no exacerbations. Continue current therapy.    Third trimester pregnancy  - Approximately 32 weeks pregnant with a baby boy, due Nov 7. Following with Dr. Smyth.  - No known pregnancy complications  - On iron supplement for iron deficiency anemia.   - Advised  fiber supplementation and increased hydration for straining with BM, likely due to pregnancy and iron.  - Flu and Tdap today     Health Maintenance  - Pap smear: pap last summer was normal at [facility].  - Mammogram: Start screening at age 40.  - Colonoscopy: Start screening at age 45-50.  - HCV: negative  - Immunizations: Flu and tdap today.   - Depression screening: negative 3/2020    Return in about 6 months (around 3/14/2021) for Annual.

## 2020-10-12 ENCOUNTER — LAB (OUTPATIENT)
Dept: LAB | Facility: HOSPITAL | Age: 30
End: 2020-10-12

## 2020-10-12 ENCOUNTER — TRANSCRIBE ORDERS (OUTPATIENT)
Dept: LAB | Facility: HOSPITAL | Age: 30
End: 2020-10-12

## 2020-10-12 DIAGNOSIS — Z3A.36 36 WEEKS GESTATION OF PREGNANCY: ICD-10-CM

## 2020-10-12 DIAGNOSIS — Z34.83 PRENATAL CARE, SUBSEQUENT PREGNANCY, THIRD TRIMESTER: ICD-10-CM

## 2020-10-12 DIAGNOSIS — Z11.3 SCREENING EXAMINATION FOR VENEREAL DISEASE: Primary | ICD-10-CM

## 2020-10-12 DIAGNOSIS — Z11.3 SCREENING EXAMINATION FOR VENEREAL DISEASE: ICD-10-CM

## 2020-10-12 LAB
DEPRECATED RDW RBC AUTO: 39.4 FL (ref 37–54)
ERYTHROCYTE [DISTWIDTH] IN BLOOD BY AUTOMATED COUNT: 12.3 % (ref 12.3–15.4)
HCT VFR BLD AUTO: 30.4 % (ref 34–46.6)
HGB BLD-MCNC: 10.4 G/DL (ref 12–15.9)
MCH RBC QN AUTO: 30.6 PG (ref 26.6–33)
MCHC RBC AUTO-ENTMCNC: 34.2 G/DL (ref 31.5–35.7)
MCV RBC AUTO: 89.4 FL (ref 79–97)
PLATELET # BLD AUTO: 213 10*3/MM3 (ref 140–450)
PMV BLD AUTO: 9.8 FL (ref 6–12)
RBC # BLD AUTO: 3.4 10*6/MM3 (ref 3.77–5.28)
WBC # BLD AUTO: 9.25 10*3/MM3 (ref 3.4–10.8)

## 2020-10-12 PROCEDURE — 36415 COLL VENOUS BLD VENIPUNCTURE: CPT

## 2020-10-12 PROCEDURE — 86803 HEPATITIS C AB TEST: CPT

## 2020-10-12 PROCEDURE — 87340 HEPATITIS B SURFACE AG IA: CPT

## 2020-10-12 PROCEDURE — 86592 SYPHILIS TEST NON-TREP QUAL: CPT

## 2020-10-12 PROCEDURE — G0432 EIA HIV-1/HIV-2 SCREEN: HCPCS

## 2020-10-12 PROCEDURE — 85027 COMPLETE CBC AUTOMATED: CPT

## 2020-10-13 LAB
HBV SURFACE AG SERPL QL IA: NORMAL
HCV AB SER DONR QL: NORMAL
HIV1+2 AB SER QL: NORMAL
RPR SER QL: NORMAL

## 2020-10-19 LAB — EXTERNAL GROUP B STREP ANTIGEN: POSITIVE

## 2020-10-29 ENCOUNTER — HOSPITAL ENCOUNTER (OUTPATIENT)
Facility: HOSPITAL | Age: 30
Discharge: HOME | End: 2020-10-29
Attending: OBSTETRICS & GYNECOLOGY | Admitting: OBSTETRICS & GYNECOLOGY

## 2020-10-29 PROCEDURE — 59025 FETAL NON-STRESS TEST: CPT

## 2020-10-29 PROCEDURE — G0463 HOSPITAL OUTPT CLINIC VISIT: HCPCS

## 2020-10-29 PROCEDURE — 99203 OFFICE O/P NEW LOW 30 MIN: CPT | Performed by: OBSTETRICS & GYNECOLOGY

## 2020-10-29 RX ORDER — VALACYCLOVIR HYDROCHLORIDE 500 MG/1
500 TABLET, FILM COATED ORAL 2 TIMES DAILY
COMMUNITY
End: 2020-11-06 | Stop reason: HOSPADM

## 2020-10-29 RX ORDER — PRENATAL WITH FERROUS FUM AND FOLIC ACID 3080; 920; 120; 400; 22; 1.84; 3; 20; 10; 1; 12; 200; 27; 25; 2 [IU]/1; [IU]/1; MG/1; [IU]/1; MG/1; MG/1; MG/1; MG/1; MG/1; MG/1; UG/1; MG/1; MG/1; MG/1; MG/1
TABLET ORAL DAILY
Status: ON HOLD | COMMUNITY
End: 2020-11-06 | Stop reason: SDUPTHER

## 2020-10-30 ENCOUNTER — HOSPITAL ENCOUNTER (OUTPATIENT)
Facility: HOSPITAL | Age: 30
End: 2020-10-30
Attending: OBSTETRICS & GYNECOLOGY | Admitting: OBSTETRICS & GYNECOLOGY

## 2020-10-30 VITALS
SYSTOLIC BLOOD PRESSURE: 105 MMHG | DIASTOLIC BLOOD PRESSURE: 78 MMHG | HEART RATE: 123 BPM | BODY MASS INDEX: 26.68 KG/M2 | WEIGHT: 145 LBS | HEIGHT: 62 IN | TEMPERATURE: 97.9 F | RESPIRATION RATE: 18 BRPM

## 2020-11-01 ENCOUNTER — APPOINTMENT (OUTPATIENT)
Dept: PREADMISSION TESTING | Facility: HOSPITAL | Age: 30
End: 2020-11-01

## 2020-11-01 PROCEDURE — U0004 COV-19 TEST NON-CDC HGH THRU: HCPCS

## 2020-11-01 PROCEDURE — C9803 HOPD COVID-19 SPEC COLLECT: HCPCS

## 2020-11-02 LAB — SARS-COV-2 RNA RESP QL NAA+PROBE: NOT DETECTED

## 2020-11-03 ENCOUNTER — PREP FOR SURGERY (OUTPATIENT)
Dept: OTHER | Facility: HOSPITAL | Age: 30
End: 2020-11-03

## 2020-11-03 ENCOUNTER — HOSPITAL ENCOUNTER (INPATIENT)
Dept: LABOR AND DELIVERY | Facility: HOSPITAL | Age: 30
LOS: 3 days | Discharge: HOME | End: 2020-11-06
Attending: OBSTETRICS & GYNECOLOGY | Admitting: OBSTETRICS & GYNECOLOGY

## 2020-11-03 DIAGNOSIS — Z34.90 TERM PREGNANCY: ICD-10-CM

## 2020-11-03 DIAGNOSIS — Z34.90 TERM PREGNANCY: Primary | ICD-10-CM

## 2020-11-03 LAB
ABO GROUP BLD: NORMAL
BLD GP AB SCN SERPL QL: NEGATIVE
DEPRECATED RDW RBC AUTO: 45.4 FL (ref 37–54)
ERYTHROCYTE [DISTWIDTH] IN BLOOD BY AUTOMATED COUNT: 13.2 % (ref 12.3–15.4)
HCT VFR BLD AUTO: 32.3 % (ref 34–46.6)
HGB BLD-MCNC: 10.7 G/DL (ref 12–15.9)
MCH RBC QN AUTO: 31.3 PG (ref 26.6–33)
MCHC RBC AUTO-ENTMCNC: 33.1 G/DL (ref 31.5–35.7)
MCV RBC AUTO: 94.4 FL (ref 79–97)
PLATELET # BLD AUTO: 219 10*3/MM3 (ref 140–450)
PMV BLD AUTO: 10.2 FL (ref 6–12)
RBC # BLD AUTO: 3.42 10*6/MM3 (ref 3.77–5.28)
RH BLD: POSITIVE
T&S EXPIRATION DATE: NORMAL
WBC # BLD AUTO: 10.36 10*3/MM3 (ref 3.4–10.8)

## 2020-11-03 PROCEDURE — 59200 INSERT CERVICAL DILATOR: CPT | Performed by: OBSTETRICS & GYNECOLOGY

## 2020-11-03 PROCEDURE — 86850 RBC ANTIBODY SCREEN: CPT | Performed by: OBSTETRICS & GYNECOLOGY

## 2020-11-03 PROCEDURE — 85027 COMPLETE CBC AUTOMATED: CPT | Performed by: OBSTETRICS & GYNECOLOGY

## 2020-11-03 PROCEDURE — 86901 BLOOD TYPING SEROLOGIC RH(D): CPT | Performed by: OBSTETRICS & GYNECOLOGY

## 2020-11-03 PROCEDURE — 86900 BLOOD TYPING SEROLOGIC ABO: CPT | Performed by: OBSTETRICS & GYNECOLOGY

## 2020-11-03 PROCEDURE — 59025 FETAL NON-STRESS TEST: CPT

## 2020-11-03 PROCEDURE — 3E0P7VZ INTRODUCTION OF HORMONE INTO FEMALE REPRODUCTIVE, VIA NATURAL OR ARTIFICIAL OPENING: ICD-10-PCS | Performed by: OBSTETRICS & GYNECOLOGY

## 2020-11-03 PROCEDURE — 25010000003 CEFAZOLIN IN DEXTROSE 2-4 GM/100ML-% SOLUTION: Performed by: OBSTETRICS & GYNECOLOGY

## 2020-11-03 RX ORDER — ONDANSETRON 2 MG/ML
4 INJECTION INTRAMUSCULAR; INTRAVENOUS EVERY 6 HOURS PRN
Status: CANCELLED | OUTPATIENT
Start: 2020-11-03

## 2020-11-03 RX ORDER — LIDOCAINE HYDROCHLORIDE 10 MG/ML
5 INJECTION, SOLUTION EPIDURAL; INFILTRATION; INTRACAUDAL; PERINEURAL AS NEEDED
Status: DISCONTINUED | OUTPATIENT
Start: 2020-11-03 | End: 2020-11-04 | Stop reason: HOSPADM

## 2020-11-03 RX ORDER — CEFAZOLIN SODIUM 2 G/100ML
2 INJECTION, SOLUTION INTRAVENOUS ONCE
Status: COMPLETED | OUTPATIENT
Start: 2020-11-03 | End: 2020-11-03

## 2020-11-03 RX ORDER — SODIUM CHLORIDE 0.9 % (FLUSH) 0.9 %
1-10 SYRINGE (ML) INJECTION AS NEEDED
Status: DISCONTINUED | OUTPATIENT
Start: 2020-11-03 | End: 2020-11-04 | Stop reason: HOSPADM

## 2020-11-03 RX ORDER — ACETAMINOPHEN 325 MG/1
650 TABLET ORAL EVERY 4 HOURS PRN
Status: DISCONTINUED | OUTPATIENT
Start: 2020-11-03 | End: 2020-11-04 | Stop reason: HOSPADM

## 2020-11-03 RX ORDER — OXYTOCIN-SODIUM CHLORIDE 0.9% IV SOLN 30 UNIT/500ML 30-0.9/5 UT/ML-%
85 SOLUTION INTRAVENOUS ONCE
Status: CANCELLED | OUTPATIENT
Start: 2020-11-03 | End: 2020-11-03

## 2020-11-03 RX ORDER — BUTORPHANOL TARTRATE 1 MG/ML
1 INJECTION, SOLUTION INTRAMUSCULAR; INTRAVENOUS
Status: CANCELLED | OUTPATIENT
Start: 2020-11-03

## 2020-11-03 RX ORDER — ONDANSETRON 2 MG/ML
4 INJECTION INTRAMUSCULAR; INTRAVENOUS EVERY 6 HOURS PRN
Status: DISCONTINUED | OUTPATIENT
Start: 2020-11-03 | End: 2020-11-04 | Stop reason: HOSPADM

## 2020-11-03 RX ORDER — MAGNESIUM CARB/ALUMINUM HYDROX 105-160MG
30 TABLET,CHEWABLE ORAL ONCE
Status: DISCONTINUED | OUTPATIENT
Start: 2020-11-03 | End: 2020-11-04 | Stop reason: HOSPADM

## 2020-11-03 RX ORDER — ONDANSETRON 4 MG/1
4 TABLET, FILM COATED ORAL EVERY 6 HOURS PRN
Status: CANCELLED | OUTPATIENT
Start: 2020-11-03

## 2020-11-03 RX ORDER — OXYTOCIN-SODIUM CHLORIDE 0.9% IV SOLN 30 UNIT/500ML 30-0.9/5 UT/ML-%
2-24 SOLUTION INTRAVENOUS
Status: CANCELLED | OUTPATIENT
Start: 2020-11-04

## 2020-11-03 RX ORDER — SODIUM CHLORIDE 0.9 % (FLUSH) 0.9 %
3 SYRINGE (ML) INJECTION EVERY 12 HOURS SCHEDULED
Status: DISCONTINUED | OUTPATIENT
Start: 2020-11-03 | End: 2020-11-04 | Stop reason: HOSPADM

## 2020-11-03 RX ORDER — BUTORPHANOL TARTRATE 1 MG/ML
1 INJECTION, SOLUTION INTRAMUSCULAR; INTRAVENOUS
Status: DISCONTINUED | OUTPATIENT
Start: 2020-11-03 | End: 2020-11-04 | Stop reason: HOSPADM

## 2020-11-03 RX ORDER — FAMOTIDINE 10 MG/ML
20 INJECTION, SOLUTION INTRAVENOUS 2 TIMES DAILY PRN
Status: DISCONTINUED | OUTPATIENT
Start: 2020-11-03 | End: 2020-11-04

## 2020-11-03 RX ORDER — MAGNESIUM CARB/ALUMINUM HYDROX 105-160MG
30 TABLET,CHEWABLE ORAL ONCE
Status: CANCELLED | OUTPATIENT
Start: 2020-11-03 | End: 2020-11-03

## 2020-11-03 RX ORDER — CEFAZOLIN SODIUM 1 G/50ML
1 INJECTION, SOLUTION INTRAVENOUS EVERY 8 HOURS
Status: DISCONTINUED | OUTPATIENT
Start: 2020-11-04 | End: 2020-11-04 | Stop reason: HOSPADM

## 2020-11-03 RX ORDER — ONDANSETRON 4 MG/1
4 TABLET, FILM COATED ORAL EVERY 6 HOURS PRN
Status: DISCONTINUED | OUTPATIENT
Start: 2020-11-03 | End: 2020-11-04 | Stop reason: HOSPADM

## 2020-11-03 RX ORDER — SODIUM CHLORIDE 0.9 % (FLUSH) 0.9 %
3 SYRINGE (ML) INJECTION EVERY 12 HOURS SCHEDULED
Status: CANCELLED | OUTPATIENT
Start: 2020-11-03

## 2020-11-03 RX ORDER — CEFAZOLIN SODIUM 2 G/100ML
2 INJECTION, SOLUTION INTRAVENOUS ONCE
Status: CANCELLED | OUTPATIENT
Start: 2020-11-03 | End: 2020-11-03

## 2020-11-03 RX ORDER — CARBOPROST TROMETHAMINE 250 UG/ML
250 INJECTION, SOLUTION INTRAMUSCULAR AS NEEDED
Status: CANCELLED | OUTPATIENT
Start: 2020-11-03

## 2020-11-03 RX ORDER — METHYLERGONOVINE MALEATE 0.2 MG/ML
200 INJECTION INTRAVENOUS ONCE AS NEEDED
Status: CANCELLED | OUTPATIENT
Start: 2020-11-03

## 2020-11-03 RX ORDER — SODIUM CHLORIDE, SODIUM LACTATE, POTASSIUM CHLORIDE, CALCIUM CHLORIDE 600; 310; 30; 20 MG/100ML; MG/100ML; MG/100ML; MG/100ML
125 INJECTION, SOLUTION INTRAVENOUS CONTINUOUS
Status: DISCONTINUED | OUTPATIENT
Start: 2020-11-03 | End: 2020-11-04

## 2020-11-03 RX ORDER — OXYTOCIN-SODIUM CHLORIDE 0.9% IV SOLN 30 UNIT/500ML 30-0.9/5 UT/ML-%
650 SOLUTION INTRAVENOUS ONCE
Status: CANCELLED | OUTPATIENT
Start: 2020-11-03 | End: 2020-11-03

## 2020-11-03 RX ORDER — OXYTOCIN-SODIUM CHLORIDE 0.9% IV SOLN 30 UNIT/500ML 30-0.9/5 UT/ML-%
2-24 SOLUTION INTRAVENOUS
Status: DISCONTINUED | OUTPATIENT
Start: 2020-11-04 | End: 2020-11-04 | Stop reason: HOSPADM

## 2020-11-03 RX ORDER — CEFAZOLIN SODIUM 1 G/50ML
1 INJECTION, SOLUTION INTRAVENOUS EVERY 8 HOURS
Status: CANCELLED | OUTPATIENT
Start: 2020-11-04 | End: 2020-11-05

## 2020-11-03 RX ORDER — MISOPROSTOL 200 UG/1
800 TABLET ORAL AS NEEDED
Status: CANCELLED | OUTPATIENT
Start: 2020-11-03

## 2020-11-03 RX ORDER — SODIUM CHLORIDE 0.9 % (FLUSH) 0.9 %
1-10 SYRINGE (ML) INJECTION AS NEEDED
Status: CANCELLED | OUTPATIENT
Start: 2020-11-03

## 2020-11-03 RX ORDER — ACETAMINOPHEN 325 MG/1
650 TABLET ORAL EVERY 4 HOURS PRN
Status: CANCELLED | OUTPATIENT
Start: 2020-11-03

## 2020-11-03 RX ORDER — TERBUTALINE SULFATE 1 MG/ML
0.25 INJECTION, SOLUTION SUBCUTANEOUS AS NEEDED
Status: CANCELLED | OUTPATIENT
Start: 2020-11-03

## 2020-11-03 RX ORDER — SODIUM CHLORIDE, SODIUM LACTATE, POTASSIUM CHLORIDE, CALCIUM CHLORIDE 600; 310; 30; 20 MG/100ML; MG/100ML; MG/100ML; MG/100ML
125 INJECTION, SOLUTION INTRAVENOUS CONTINUOUS
Status: CANCELLED | OUTPATIENT
Start: 2020-11-03

## 2020-11-03 RX ORDER — LIDOCAINE HYDROCHLORIDE 10 MG/ML
5 INJECTION, SOLUTION EPIDURAL; INFILTRATION; INTRACAUDAL; PERINEURAL AS NEEDED
Status: CANCELLED | OUTPATIENT
Start: 2020-11-03

## 2020-11-03 RX ORDER — IBUPROFEN 600 MG/1
600 TABLET ORAL EVERY 6 HOURS PRN
Status: CANCELLED | OUTPATIENT
Start: 2020-11-03

## 2020-11-03 RX ORDER — TERBUTALINE SULFATE 1 MG/ML
0.25 INJECTION, SOLUTION SUBCUTANEOUS AS NEEDED
Status: DISCONTINUED | OUTPATIENT
Start: 2020-11-03 | End: 2020-11-04 | Stop reason: HOSPADM

## 2020-11-03 RX ADMIN — CEFAZOLIN SODIUM 2 G: 2 INJECTION, SOLUTION INTRAVENOUS at 18:29

## 2020-11-03 RX ADMIN — FAMOTIDINE 20 MG: 10 INJECTION INTRAVENOUS at 19:59

## 2020-11-03 RX ADMIN — SODIUM CHLORIDE, POTASSIUM CHLORIDE, SODIUM LACTATE AND CALCIUM CHLORIDE 125 ML/HR: 600; 310; 30; 20 INJECTION, SOLUTION INTRAVENOUS at 18:29

## 2020-11-04 PROBLEM — Z34.93 THIRD TRIMESTER PREGNANCY: Status: RESOLVED | Noted: 2020-09-14 | Resolved: 2020-11-04

## 2020-11-04 PROBLEM — Z3A.01 LESS THAN 8 WEEKS GESTATION OF PREGNANCY: Status: RESOLVED | Noted: 2020-03-17 | Resolved: 2020-11-04

## 2020-11-04 PROBLEM — Z34.90 TERM PREGNANCY: Status: RESOLVED | Noted: 2020-11-03 | Resolved: 2020-11-04

## 2020-11-04 PROCEDURE — 10907ZC DRAINAGE OF AMNIOTIC FLUID, THERAPEUTIC FROM PRODUCTS OF CONCEPTION, VIA NATURAL OR ARTIFICIAL OPENING: ICD-10-PCS | Performed by: OBSTETRICS & GYNECOLOGY

## 2020-11-04 PROCEDURE — 3E033VJ INTRODUCTION OF OTHER HORMONE INTO PERIPHERAL VEIN, PERCUTANEOUS APPROACH: ICD-10-PCS | Performed by: OBSTETRICS & GYNECOLOGY

## 2020-11-04 PROCEDURE — 25010000002 BUTORPHANOL PER 1 MG: Performed by: OBSTETRICS & GYNECOLOGY

## 2020-11-04 PROCEDURE — 59025 FETAL NON-STRESS TEST: CPT

## 2020-11-04 PROCEDURE — 25010000002 CEFAZOLIN 1-4 GM/50ML-% SOLUTION: Performed by: OBSTETRICS & GYNECOLOGY

## 2020-11-04 PROCEDURE — 25010000002 ONDANSETRON PER 1 MG: Performed by: OBSTETRICS & GYNECOLOGY

## 2020-11-04 RX ORDER — SODIUM CHLORIDE 0.9 % (FLUSH) 0.9 %
1-10 SYRINGE (ML) INJECTION AS NEEDED
Status: DISCONTINUED | OUTPATIENT
Start: 2020-11-04 | End: 2020-11-06 | Stop reason: HOSPADM

## 2020-11-04 RX ORDER — BISACODYL 10 MG
10 SUPPOSITORY, RECTAL RECTAL DAILY PRN
Status: DISCONTINUED | OUTPATIENT
Start: 2020-11-05 | End: 2020-11-06 | Stop reason: HOSPADM

## 2020-11-04 RX ORDER — OXYTOCIN-SODIUM CHLORIDE 0.9% IV SOLN 30 UNIT/500ML 30-0.9/5 UT/ML-%
85 SOLUTION INTRAVENOUS ONCE
Status: DISCONTINUED | OUTPATIENT
Start: 2020-11-04 | End: 2020-11-04 | Stop reason: HOSPADM

## 2020-11-04 RX ORDER — MISOPROSTOL 200 UG/1
800 TABLET ORAL AS NEEDED
Status: DISCONTINUED | OUTPATIENT
Start: 2020-11-04 | End: 2020-11-04 | Stop reason: HOSPADM

## 2020-11-04 RX ORDER — IBUPROFEN 600 MG/1
600 TABLET ORAL EVERY 6 HOURS PRN
Status: DISCONTINUED | OUTPATIENT
Start: 2020-11-04 | End: 2020-11-04 | Stop reason: HOSPADM

## 2020-11-04 RX ORDER — HYDROCORTISONE 25 MG/G
1 CREAM TOPICAL AS NEEDED
Status: DISCONTINUED | OUTPATIENT
Start: 2020-11-04 | End: 2020-11-06 | Stop reason: HOSPADM

## 2020-11-04 RX ORDER — HYDROCODONE BITARTRATE AND ACETAMINOPHEN 5; 325 MG/1; MG/1
1 TABLET ORAL ONCE AS NEEDED
Status: COMPLETED | OUTPATIENT
Start: 2020-11-04 | End: 2020-11-04

## 2020-11-04 RX ORDER — ONDANSETRON 2 MG/ML
4 INJECTION INTRAMUSCULAR; INTRAVENOUS EVERY 6 HOURS PRN
Status: DISCONTINUED | OUTPATIENT
Start: 2020-11-04 | End: 2020-11-06 | Stop reason: HOSPADM

## 2020-11-04 RX ORDER — DOCUSATE SODIUM 100 MG/1
100 CAPSULE, LIQUID FILLED ORAL 2 TIMES DAILY
Status: DISCONTINUED | OUTPATIENT
Start: 2020-11-04 | End: 2020-11-06 | Stop reason: HOSPADM

## 2020-11-04 RX ORDER — IBUPROFEN 600 MG/1
600 TABLET ORAL EVERY 6 HOURS PRN
Status: DISCONTINUED | OUTPATIENT
Start: 2020-11-04 | End: 2020-11-06 | Stop reason: HOSPADM

## 2020-11-04 RX ORDER — LANOLIN
CREAM (ML) TOPICAL
Status: DISCONTINUED | OUTPATIENT
Start: 2020-11-04 | End: 2020-11-06 | Stop reason: HOSPADM

## 2020-11-04 RX ORDER — PRENATAL VIT/IRON FUM/FOLIC AC 27MG-0.8MG
1 TABLET ORAL DAILY
Status: DISCONTINUED | OUTPATIENT
Start: 2020-11-04 | End: 2020-11-06 | Stop reason: HOSPADM

## 2020-11-04 RX ORDER — CARBOPROST TROMETHAMINE 250 UG/ML
250 INJECTION, SOLUTION INTRAMUSCULAR AS NEEDED
Status: DISCONTINUED | OUTPATIENT
Start: 2020-11-04 | End: 2020-11-04 | Stop reason: HOSPADM

## 2020-11-04 RX ORDER — OXYTOCIN-SODIUM CHLORIDE 0.9% IV SOLN 30 UNIT/500ML 30-0.9/5 UT/ML-%
650 SOLUTION INTRAVENOUS ONCE
Status: COMPLETED | OUTPATIENT
Start: 2020-11-04 | End: 2020-11-04

## 2020-11-04 RX ORDER — METHYLERGONOVINE MALEATE 0.2 MG/ML
200 INJECTION INTRAVENOUS ONCE AS NEEDED
Status: DISCONTINUED | OUTPATIENT
Start: 2020-11-04 | End: 2020-11-04 | Stop reason: HOSPADM

## 2020-11-04 RX ADMIN — IBUPROFEN 600 MG: 600 TABLET, FILM COATED ORAL at 10:52

## 2020-11-04 RX ADMIN — HYDROCODONE BITARTRATE AND ACETAMINOPHEN 1 TABLET: 5; 325 TABLET ORAL at 11:51

## 2020-11-04 RX ADMIN — PRENATAL VITAMINS-IRON FUMARATE 27 MG IRON-FOLIC ACID 0.8 MG TABLET 1 TABLET: at 15:44

## 2020-11-04 RX ADMIN — OXYTOCIN 2 MILLI-UNITS/MIN: 10 INJECTION INTRAVENOUS at 05:14

## 2020-11-04 RX ADMIN — BUTORPHANOL TARTRATE 1 MG: 1 INJECTION, SOLUTION INTRAMUSCULAR; INTRAVENOUS at 09:48

## 2020-11-04 RX ADMIN — DOCUSATE SODIUM 100 MG: 100 CAPSULE ORAL at 20:15

## 2020-11-04 RX ADMIN — WITCH HAZEL 1 PAD: 500 SOLUTION RECTAL; TOPICAL at 15:49

## 2020-11-04 RX ADMIN — Medication: at 15:49

## 2020-11-04 RX ADMIN — HYDROCORTISONE 2.5% 1 APPLICATION: 25 CREAM TOPICAL at 15:49

## 2020-11-04 RX ADMIN — IBUPROFEN 600 MG: 600 TABLET, FILM COATED ORAL at 15:44

## 2020-11-04 RX ADMIN — CEFAZOLIN SODIUM 1 G: 1 INJECTION, SOLUTION INTRAVENOUS at 02:27

## 2020-11-04 RX ADMIN — IBUPROFEN 600 MG: 600 TABLET, FILM COATED ORAL at 22:22

## 2020-11-04 RX ADMIN — ONDANSETRON 4 MG: 2 INJECTION INTRAMUSCULAR; INTRAVENOUS at 09:49

## 2020-11-04 RX ADMIN — OXYTOCIN 650 ML/HR: 10 INJECTION INTRAVENOUS at 10:19

## 2020-11-04 RX ADMIN — BUTORPHANOL TARTRATE 1 MG: 1 INJECTION, SOLUTION INTRAMUSCULAR; INTRAVENOUS at 07:38

## 2020-11-04 RX ADMIN — DOCUSATE SODIUM 100 MG: 100 CAPSULE ORAL at 15:44

## 2020-11-04 NOTE — H&P
[facility]  Obstetric History and Physical    Chief Complaint   Patient presents with   • Scheduled Induction     Elective FB with PPP       Subjective     Patient is a 30 y.o. female  currently at 39w4d, who presents for induction of labor  for elective  with unfavorable cervix.    Her prenatal care is complicated by h/o HSV--had outbreak >1mo ago which has resolved.  Has remained on valtrex since then.  She found out her partner was abusing IV drugs during her pregnancy--repeat serum STI screening negative at 36 weeks.  Her previous obstetric/gynecological history is noted for is non-contributory.    The following portions of the patients history were reviewed and updated as appropriate: current medications, allergies, past medical history, past surgical history, past family history, past social history and problem list .       Prenatal Information:   Maternal Prenatal Labs  Blood Type ABO Type   Date Value Ref Range Status   2020 O  Final      Rh Status RH type   Date Value Ref Range Status   2020 Positive  Final      Antibody Screen Antibody Screen   Date Value Ref Range Status   2020 Negative  Final      Gonnorhea No results found for: GCCX   Chlamydia No results found for: CLAMYDCU   RPR No results found for: RPR   Syphilis Antibody No results found for: SYPHILIS   Rubella No results found for: RUBELLAIGGIN   Hepatitis B Surface Antigen No results found for: HEPBSAG   HIV-1 Antibody No results found for: LABHIV1   Hepatitis C Antibody No results found for: HEPCAB   Rapid Urin Drug Screen No results found for: AMPMETHU, BARBITSCNUR, LABBENZSCN, LABMETHSCN, LABOPIASCN, THCURSCR, COCAINEUR, AMPHETSCREEN, PROPOXSCN, BUPRENORSCNU, METAMPSCNUR, OXYCODONESCN, TRICYCLICSCN   Group B Strep Culture No results found for: GBSANTIGEN                 Past OB History:       OB History    Para Term  AB Living   4 1 1 0 2 1   SAB TAB Ectopic Molar Multiple Live Births   0 0 0 0 0 1       # Outcome Date GA Lbr Miguel A/2nd Weight Sex Delivery Anes PTL Lv   4 Current            3 AB 01/01/17           2 Term 10/05/14 39w0d  2722 g (6 lb) F Vag-Spont EPI N ESTEPHANIE   1 AB 01/01/14              Obstetric Comments   6 weeks pregnant       Past Medical History: Past Medical History:   Diagnosis Date   • Abnormal Pap smear of cervix    • Asthma    • Depression 8/24/2016     start prozac 10 mg po qd   • Esophagitis, reflux    • Headache    • HSV-2 infection    • Hypoglycemia    • Proteinuria 8/25/2016   • Seizures (CMS/HCC)     had 1 or 2 -in teens, unsure of etiology    • UTI (urinary tract infection)       Past Surgical History Past Surgical History:   Procedure Laterality Date   • COLPOSCOPY      after first pregnancy    • DENTAL PROCEDURE     • FINGER SURGERY Right     forefinger   • VAGINAL DELIVERY     • WISDOM TOOTH EXTRACTION        Family History: Family History   Problem Relation Age of Onset   • ALS Father    • Diabetes Other         maternal family members   • Alcohol abuse Mother    • Hyperlipidemia Maternal Grandfather    • Hypertension Maternal Grandfather    • Melanoma Maternal Grandfather    • Cancer Paternal Grandmother         uncertain what type, maybe ovarian      Social History:  reports that she quit smoking about 8 years ago. Her smoking use included cigarettes. She quit after 2.00 years of use. She has never used smokeless tobacco.   reports previous alcohol use.   reports no history of drug use.        REVIEW OF SYSTEMS              Reports fetal movement is normal             Denies leakage of amniotic fluid.             Denies vaginal bleeding             She reports No contractions             All other systems reviewed and are negative    Objective       Vital Signs Range for the last 24 hours  Temperature: Temp:  [97.8 °F (36.6 °C)-98.6 °F (37 °C)] 98 °F (36.7 °C)   Temp Source: Temp src: Oral   BP: BP: ()/(47-75) 108/67   Pulse: Heart Rate:  [] 102   Respirations: Resp:   [16-18] 16   SPO2:     O2 Amount (l/min):     O2 Devices     Weight: Weight:  [65.8 kg (145 lb)] 65.8 kg (145 lb)       Presentation: vtx   Cervix: Exam by: Method: sterile exam per physician   Dilation: Cervical Dilation (cm): 4   Effacement: Cervical Effacement: 60%   Station: Fetal Station: -3        Fetal Heart Rate Assessment   Method: Fetal HR Assessment Method: external   Beats/min: Fetal HR (beats/min): 135   Baseline: Fetal Heart Baseline Rate: normal range   Varibility: Fetal HR Variability: moderate (amplitude range 6 to 25 bpm)   Accels: Fetal HR Accelerations: absent   Decels: Fetal HR Decelerations: early, variable   Tracing Category:       Uterine Assessment   Method: Method: external tocotransducer   Frequency (min): Contraction Frequency (Minutes): 2-3   Ctx Count in 10 min: Contractions in 10 Minutes: x1   Duration:     Intensity: Contraction Intensity: moderate by palpation   Intensity by IUPC:     Resting Tone: Uterine Resting Tone: soft by palpation   Resting Tone by IUPC:     Wilberto Units:       Constitutional:  Well developed, well nourished, no acute distress, well-groomed.   Respiratory:  Lungs are clear to auscultation bilaterally, normal breath sounds.   Cardiovascular:  Normal rate and rhythm, no murmurs.   Gastrointestinal:  Soft, gravid, nontender.  Uterus: Soft, nontender. Fundus appropriate for dates.  Neurologic:  Alert & oriented x 3,  no focal deficits noted.   Psychiatric:  Speech and behavior appropriate.   Extremities: no cyanosis, clubbing or edema, no evidence of DVT.        Labs:  Lab Results   Component Value Date    WBC 10.36 11/03/2020    HGB 10.7 (L) 11/03/2020    HCT 32.3 (L) 11/03/2020    MCV 94.4 11/03/2020     11/03/2020    AST 14 03/16/2020    ALT 7 03/16/2020     Results from last 7 days   Lab Units 11/03/20  1815   ABO TYPING  O   RH TYPING  Positive   ANTIBODY SCREEN  Negative           Assessment/Plan       Term pregnancy        Assessment:  1.   Intrauterine pregnancy at 39w4d weeks gestation with reactive fetal status.    2.   induction of labor  for elective  with unfavorable cervix  3.  Obstetrical history significant for h/o genital HSV, on suppression, and FOB abusing IV drugs.  4.  GBS status: positive    Plan:  1.Sultana bulb for cervical ripening overnight.  Now pitocin per protocol.  AROM when fetal head engaged.  Ancef per protocol for GBS (PCN causes GI upset).  Epidural prn.  Repeat SVE 4h or prn  2. Plan of care has been reviewed with patient and questions answered.  3.  Risks, benefits of treatment plan have been discussed.  4.  All questions have been answered.        Isaura Smyth MD  11/4/2020  09:22 EST

## 2020-11-04 NOTE — PLAN OF CARE
Problem: Breastfeeding  Goal: Effective Breastfeeding  Outcome: Ongoing, Progressing  Intervention: Promote Breast Care and Comfort  Flowsheets (Taken 11/4/2020 1600)  Breast Care: Breastfeeding:   lanolin to nipples   open to air  Intervention: Promote Effective Breastfeeding  Flowsheets (Taken 11/4/2020 1600)  Breastfeeding Assistance:   feeding cue recognition promoted   feeding on demand promoted   infant stimulated to wakeful state   support offered  Parent/Child Attachment Promotion:   cue recognition promoted   positive reinforcement provided   rooming-in promoted   skin-to-skin contact encouraged   Goal Outcome Evaluation:

## 2020-11-04 NOTE — PAYOR COMM NOTE
"Cristina Dos Santos (30 y.o. Female)     [employer] ID#[id number]    Delivery information.    From: Monisha Aggarwal  #[phone]  Fax#[phone]      Date of Birth Social Security Number Address Home Phone MRN    1990  [address] [place] [address] [phone] [id number]    Quaker Marital Status          Lutheran        Admission Date Admission Type Admitting Provider Attending Provider Department, Room/Bed    11/3/20 Elective Isaura Smyht MD Borders, Sarah E, MD [facility] MOTHER BABY 4A, N416/1    Discharge Date Discharge Disposition Discharge Destination                       Attending Provider: Isaura Smyth MD    Allergies: Amoxicillin, Penicillins    Isolation: None   Infection: None   Code Status: CPR    Ht: 157.5 cm (62\")   Wt: 65.8 kg (145 lb)    Admission Cmt: None   Principal Problem: None                Active Insurance as of 11/3/2020     Primary Coverage     Payor Plan Insurance Group Employer/Plan Group    [employer] [place] [employer] [place]      Payor Plan Address Payor Plan Phone Number Payor Plan Fax Number Effective Dates    [address] [address] [phone]  2016 - None Entered    [facility] [id number]       Subscriber Name Subscriber Birth Date Member ID       EMELIAJULIOCRISTINA A 1990 [id number]                 Emergency Contacts      (Rel.) Home Phone Work Phone Mobile Phone    Raffaele Spears (Significant Other) [phone] -- --    Nasima Dos Santos (Sister) -- -- [phone]            Insurance Information                [facility] MEDICAID Phone: [phone]    Subscriber: Cristina Dos Santos Subscriber#: [id number]    Group#:  Precert#:           Problem List           Codes Noted - Resolved       Hospital     (normal spontaneous vaginal delivery) ICD-10-CM: O80  ICD-9-CM: 650 2020 - Present       Non-Hospital    Mild intermittent asthma with acute exacerbation ICD-10-CM: J45.21  ICD-9-CM: 493.92 2020 - " Present    Asthma ICD-10-CM: J45.909  ICD-9-CM: 493.90 2016 - Present             History & Physical      Borders, Isaura MAYES MD at 20 0922          [facility]  Obstetric History and Physical    Chief Complaint   Patient presents with   • Scheduled Induction     Elective FB with PPP       Subjective     Patient is a 30 y.o. female  currently at 39w4d, who presents for induction of labor  for elective  with unfavorable cervix.    Her prenatal care is complicated by h/o HSV--had outbreak >1mo ago which has resolved.  Has remained on valtrex since then.  She found out her partner was abusing IV drugs during her pregnancy--repeat serum STI screening negative at 36 weeks.  Her previous obstetric/gynecological history is noted for is non-contributory.    The following portions of the patients history were reviewed and updated as appropriate: current medications, allergies, past medical history, past surgical history, past family history, past social history and problem list .       Prenatal Information:   Maternal Prenatal Labs  Blood Type ABO Type   Date Value Ref Range Status   2020 O  Final      Rh Status RH type   Date Value Ref Range Status   2020 Positive  Final      Antibody Screen Antibody Screen   Date Value Ref Range Status   2020 Negative  Final      Gonnorhea No results found for: GCCX   Chlamydia No results found for: CLAMYDCU   RPR No results found for: RPR   Syphilis Antibody No results found for: SYPHILIS   Rubella No results found for: RUBELLAIGGIN   Hepatitis B Surface Antigen No results found for: HEPBSAG   HIV-1 Antibody No results found for: LABHIV1   Hepatitis C Antibody No results found for: HEPCAB   Rapid Urin Drug Screen No results found for: AMPMETHU, BARBITSCNUR, LABBENZSCN, LABMETHSCN, LABOPIASCN, THCURSCR, COCAINEUR, AMPHETSCREEN, PROPOXSCN, BUPRENORSCNU, METAMPSCNUR, OXYCODONESCN, TRICYCLICSCN   Group B Strep Culture No results found for: GBSANTIGEN                  Past OB History:       OB History    Para Term  AB Living   4 1 1 0 2 1   SAB TAB Ectopic Molar Multiple Live Births   0 0 0 0 0 1      # Outcome Date GA Lbr Miguel A/2nd Weight Sex Delivery Anes PTL Lv   4 Current            3 AB 17           2 Term 10/05/14 39w0d  2722 g (6 lb) F Vag-Spont EPI N ESTEPHANIE   1 AB 14              Obstetric Comments   6 weeks pregnant       Past Medical History: Past Medical History:   Diagnosis Date   • Abnormal Pap smear of cervix    • Asthma    • Depression 2016     start prozac 10 mg po qd   • Esophagitis, reflux    • Headache    • HSV-2 infection    • Hypoglycemia    • Proteinuria 2016   • Seizures (CMS/HCC)     had 1 or 2 -in teens, unsure of etiology    • UTI (urinary tract infection)       Past Surgical History Past Surgical History:   Procedure Laterality Date   • COLPOSCOPY      after first pregnancy    • DENTAL PROCEDURE     • FINGER SURGERY Right     forefinger   • VAGINAL DELIVERY     • WISDOM TOOTH EXTRACTION        Family History: Family History   Problem Relation Age of Onset   • ALS Father    • Diabetes Other         maternal family members   • Alcohol abuse Mother    • Hyperlipidemia Maternal Grandfather    • Hypertension Maternal Grandfather    • Melanoma Maternal Grandfather    • Cancer Paternal Grandmother         uncertain what type, maybe ovarian      Social History:  reports that she quit smoking about 8 years ago. Her smoking use included cigarettes. She quit after 2.00 years of use. She has never used smokeless tobacco.   reports previous alcohol use.   reports no history of drug use.        REVIEW OF SYSTEMS              Reports fetal movement is normal             Denies leakage of amniotic fluid.             Denies vaginal bleeding             She reports No contractions             All other systems reviewed and are negative    Objective       Vital Signs Range for the last 24 hours  Temperature: Temp:  [97.8 °F (36.6  °C)-98.6 °F (37 °C)] 98 °F (36.7 °C)   Temp Source: Temp src: Oral   BP: BP: ()/(47-75) 108/67   Pulse: Heart Rate:  [] 102   Respirations: Resp:  [16-18] 16   SPO2:     O2 Amount (l/min):     O2 Devices     Weight: Weight:  [65.8 kg (145 lb)] 65.8 kg (145 lb)       Presentation: vtx   Cervix: Exam by: Method: sterile exam per physician   Dilation: Cervical Dilation (cm): 4   Effacement: Cervical Effacement: 60%   Station: Fetal Station: -3        Fetal Heart Rate Assessment   Method: Fetal HR Assessment Method: external   Beats/min: Fetal HR (beats/min): 135   Baseline: Fetal Heart Baseline Rate: normal range   Varibility: Fetal HR Variability: moderate (amplitude range 6 to 25 bpm)   Accels: Fetal HR Accelerations: absent   Decels: Fetal HR Decelerations: early, variable   Tracing Category:       Uterine Assessment   Method: Method: external tocotransducer   Frequency (min): Contraction Frequency (Minutes): 2-3   Ctx Count in 10 min: Contractions in 10 Minutes: x1   Duration:     Intensity: Contraction Intensity: moderate by palpation   Intensity by IUPC:     Resting Tone: Uterine Resting Tone: soft by palpation   Resting Tone by IUPC:     Wilberto Units:       Constitutional:  Well developed, well nourished, no acute distress, well-groomed.   Respiratory:  Lungs are clear to auscultation bilaterally, normal breath sounds.   Cardiovascular:  Normal rate and rhythm, no murmurs.   Gastrointestinal:  Soft, gravid, nontender.  Uterus: Soft, nontender. Fundus appropriate for dates.  Neurologic:  Alert & oriented x 3,  no focal deficits noted.   Psychiatric:  Speech and behavior appropriate.   Extremities: no cyanosis, clubbing or edema, no evidence of DVT.        Labs:  Lab Results   Component Value Date    WBC 10.36 11/03/2020    HGB 10.7 (L) 11/03/2020    HCT 32.3 (L) 11/03/2020    MCV 94.4 11/03/2020     11/03/2020    AST 14 03/16/2020    ALT 7 03/16/2020     Results from last 7 days   Lab Units  11/03/20  1815   ABO TYPING  O   RH TYPING  Positive   ANTIBODY SCREEN  Negative           Assessment/Plan       Term pregnancy        Assessment:  1.  Intrauterine pregnancy at 39w4d weeks gestation with reactive fetal status.    2.   induction of labor  for elective  with unfavorable cervix  3.  Obstetrical history significant for h/o genital HSV, on suppression, and FOB abusing IV drugs.  4.  GBS status: positive    Plan:  1.Sultana bulb for cervical ripening overnight.  Now pitocin per protocol.  AROM when fetal head engaged.  Ancef per protocol for GBS (PCN causes GI upset).  Epidural prn.  Repeat SVE 4h or prn  2. Plan of care has been reviewed with patient and questions answered.  3.  Risks, benefits of treatment plan have been discussed.  4.  All questions have been answered.        Isaura Smyth MD  11/4/2020  09:22 EST     Electronically signed by Isaura Smyth MD at 11/04/20 0925     H&P signed by New Onbase, [place] at 10/19/20 1410      Scan on 11/3/2020 by [employer], [employer]: PRENATAL LWH 10/19/2020          Electronically signed by [employer] On[employer], [employer] at 10/19/20 1410         Current Facility-Administered Medications   Medication Dose Route Frequency Provider Last Rate Last Dose   • benzocaine (AMERICAINE) 20 % rectal ointment 1 application  1 application Rectal PRN Aba Torres CNM       • benzocaine-menthol (DERMOPLAST) 20-0.5 % topical spray   Topical PRN Aba Torres CNM       • [START ON 11/5/2020] bisacodyl (DULCOLAX) suppository 10 mg  10 mg Rectal Daily PRN Aba Torres CNM       • docusate sodium (COLACE) capsule 100 mg  100 mg Oral BID Aba Torres CNM       • Hydrocortisone (Perianal) (ANUSOL-HC) 2.5 % rectal cream 1 application  1 application Rectal PRN Aba Torres CNM       • ibuprofen (ADVIL,MOTRIN) tablet 600 mg  600 mg Oral Q6H PRN Aba Torres CNM       • lanolin cream   Topical Q1H PRN Aba Torres CNM       • magnesium hydroxide (MILK OF MAGNESIA)  suspension 2400 mg/10mL 10 mL  10 mL Oral Daily PRN Aba Torres CNM       • ondansetron (ZOFRAN) injection 4 mg  4 mg Intravenous Q6H PRN Aba Torres CNM       • prenatal vitamin tablet 1 tablet  1 tablet Oral Daily Aba Torres CNM       • sodium chloride 0.9 % flush 1-10 mL  1-10 mL Intravenous PRN Aba Torres CNM       • witch hazel-glycerin (TUCKS) pad 1 pad  1 each Topical PRN Aba Torres CNM           Lab Results (last 24 hours)     Procedure Component Value Units Date/Time    CBC (No Diff) [[id number]]  (Abnormal) Collected: 11/03/20 1846    Specimen: Blood Updated: 11/03/20 1927     WBC 10.36 10*3/mm3      RBC 3.42 10*6/mm3      Hemoglobin 10.7 g/dL      Hematocrit 32.3 %      MCV 94.4 fL      MCH 31.3 pg      MCHC 33.1 g/dL      RDW 13.2 %      RDW-SD 45.4 fl      MPV 10.2 fL      Platelets 219 10*3/mm3     Group B Streptococcus Culture - Swab, Vaginal/Rectum [[id number]] Resulted: 10/19/20     Specimen: Swab from Vaginal/Rectum Updated: 11/03/20 1910     External Strep Group B Ag Positive        Orders (last 24 hrs)      Start     Ordered    11/05/20 0800  Sitz Bath  3 Times Daily     Comments: PRN    11/04/20 1226    11/05/20 0600  CBC & Differential  Timed     Comments: Postpartum Day 1      11/04/20 1226    11/05/20 0000  bisacodyl (DULCOLAX) suppository 10 mg  Daily PRN      11/04/20 1226    11/04/20 1315  docusate sodium (COLACE) capsule 100 mg  2 Times Daily      11/04/20 1226    11/04/20 1315  prenatal vitamin tablet 1 tablet  Daily      11/04/20 1226    11/04/20 1227  Transfer Patient  Once      11/04/20 1226    11/04/20 1227  Code Status and Medical Interventions:  Continuous      11/04/20 1226    11/04/20 1227  Vital Signs Per hospital policy  Per Hospital Policy      11/04/20 1226    11/04/20 1227  Up Ad Valerie  Until Discontinued      11/04/20 1226    11/04/20 1227  Ambulate Patient  Every Shift      11/04/20 1226    11/04/20 1227  Fundal and Lochia Check  Per Hospital Policy      Comments: Q 15 min x 4, Q 30 min x 2, then Q Shift    11/04/20 1226    11/04/20 1227  RN to Assess Rh Status & Place RhIG Evaluation Order if Indicated  Continuous      11/04/20 1226    11/04/20 1227  Bladder Assessment  Per Order Details     Comments: Postpartum 1) Upon Admission to Unit & Every 4 Hours PRN Until Voiding. 2) Out of Bed to Void in 8 Hours.    11/04/20 1226    11/04/20 1227  Straight Cath  Per Order Details     Comments: Postpartum: If Distended & Unable to Void, May Repeat Once.    11/04/20 1226    11/04/20 1227  Indwelling Urinary Catheter  Per Order Details     Comments: Postpartum : After Straight Cathed x2 or if Greater Than 1000mL Residual, Insert Indwelling Urinary Catheter Until Further MD Order.    11/04/20 1226    11/04/20 1227  Breast pump to bed  Once      11/04/20 1226    11/04/20 1227  Notify Physician  Until Discontinued      11/04/20 1226    11/04/20 1227  Diet Regular  Diet Effective Now      11/04/20 1226    11/04/20 1227  Advance Diet as Tolerated  Until Discontinued      11/04/20 1226    11/04/20 1227  If indicated -- Please administer RH Immunoglobulin based on results of cord blood evaluation and fetal screen lab tests, pharmacy to dispense  Per Order Details     Comments: See Process Instructions For Reference Range Details.    11/04/20 1226    11/04/20 1226  ibuprofen (ADVIL,MOTRIN) tablet 600 mg  Every 6 Hours PRN      11/04/20 1226    11/04/20 1226  magnesium hydroxide (MILK OF MAGNESIA) suspension 2400 mg/10mL 10 mL  Daily PRN      11/04/20 1226    11/04/20 1226  ondansetron (ZOFRAN) injection 4 mg  Every 6 Hours PRN      11/04/20 1226    11/04/20 1226  lanolin cream  Every 1 Hour PRN      11/04/20 1226    11/04/20 1226  benzocaine-menthol (DERMOPLAST) 20-0.5 % topical spray  As Needed      11/04/20 1226    11/04/20 1226  witch hazel-glycerin (TUCKS) pad 1 pad  As Needed      11/04/20 1226    11/04/20 1226  Hydrocortisone (Perianal) (ANUSOL-HC) 2.5 % rectal cream 1  application  As Needed      11/04/20 1226    11/04/20 1226  benzocaine (AMERICAINE) 20 % rectal ointment 1 application  As Needed      11/04/20 1226    11/04/20 1226  sodium chloride 0.9 % flush 1-10 mL  As Needed      11/04/20 1226    11/04/20 1146  HYDROcodone-acetaminophen (NORCO) 5-325 MG per tablet 1 tablet  Once As Needed      11/04/20 1146    11/04/20 1115  oxytocin in sodium chloride (PITOCIN) 30 UNIT/500ML infusion solution  Once      11/04/20 1027    11/04/20 1115  oxytocin in sodium chloride (PITOCIN) 30 UNIT/500ML infusion solution  Once,   Status:  Discontinued      11/04/20 1027    11/04/20 1035  DIET MESSAGE Please send chicken tenders and french fries, thank you.  Once     Comments: Please send chicken tenders and french fries, thank you.    11/04/20 1034    11/04/20 1032  VTE Prophylaxis Not Indicated: No Risk Factors (0); </= 3 (Low Risk)  Once      11/04/20 1031    11/04/20 1028  Fundal & Lochia Check  Per Order Details,   Status:  Canceled     Comments: Every 15 Minutes x4, Then Every 30 Minutes x2, Then Every Shift    11/04/20 1027    11/04/20 1028  Notify Physician (specified)  Until Discontinued,   Status:  Canceled      11/04/20 1027    11/04/20 1028  Vital Signs Per hospital policy  Per Hospital Policy,   Status:  Canceled      11/04/20 1027    11/04/20 1028  Diet Regular  Diet Effective Now,   Status:  Canceled      11/04/20 1027    11/04/20 1027  Apply Ice to Perineum  As Needed,   Status:  Canceled      11/04/20 1027    11/04/20 1027  Fundal & Lochia Check  Every Shift,   Status:  Canceled      11/04/20 1027    11/04/20 1027  carboprost (HEMABATE) injection 250 mcg  As Needed,   Status:  Discontinued      11/04/20 1027    11/04/20 1027  ibuprofen (ADVIL,MOTRIN) tablet 600 mg  Every 6 Hours PRN,   Status:  Discontinued      11/04/20 1027    11/04/20 1027  methylergonovine (METHERGINE) injection 200 mcg  Once As Needed,   Status:  Discontinued      11/04/20 1027    11/04/20 1027   miSOPROStol (CYTOTEC) tablet 800 mcg  As Needed,   Status:  Discontinued      11/04/20 1027    11/04/20 0500  oxytocin in sodium chloride (PITOCIN) 30 UNIT/500ML infusion solution  Titrated,   Status:  Discontinued      11/03/20 1724    11/04/20 0200  ceFAZolin (ANCEF) IVPB 1 g  Every 8 Hours,   Status:  Discontinued      11/03/20 1724    11/03/20 2100  sodium chloride 0.9 % flush 3 mL  Every 12 Hours Scheduled,   Status:  Discontinued      11/03/20 1724    11/03/20 1944  famotidine (PEPCID) injection 20 mg  2 Times Daily PRN,   Status:  Discontinued      11/03/20 1944    11/03/20 1815  lactated ringers infusion  Continuous,   Status:  Discontinued      11/03/20 1724 11/03/20 1815  ceFAZolin in dextrose (ANCEF) IVPB solution 2 g  Once      11/03/20 1724    11/03/20 1815  mineral oil liquid 30 mL  Once,   Status:  Discontinued      11/03/20 1724    11/03/20 1725  Admit To Obstetrics Inpatient  Once      11/03/20 1724    11/03/20 1725  Code Status and Medical Interventions:  Continuous,   Status:  Canceled      11/03/20 1724    11/03/20 1725  Insert Peripheral IV  Once,   Status:  Canceled      11/03/20 1724    11/03/20 1725  Saline Lock & Maintain IV Access  Continuous,   Status:  Canceled      11/03/20 1724    11/03/20 1725  CBC (No Diff)  Once      11/03/20 1724    11/03/20 1725  Continuous Fetal Monitoring With NST on Admission and Prior to Initiation of Oxytocin.  Per Order Details,   Status:  Canceled     Comments: Continuous Fetal Monitoring With NST on Admission & Prior to Initiation of Oxytocin.    11/03/20 1724    11/03/20 1725  Diet Clear Liquid  Diet Effective Now,   Status:  Canceled      11/03/20 1724    11/03/20 1725  External Uterine Contraction Monitoring  Per Hospital Policy,   Status:  Canceled      11/03/20 1724    11/03/20 1725  Sultana Bulb Cervical Ripening w/o infusion  Once,   Status:  Canceled     Comments: Have laborist place cervical Sultana w/o infusion.  If unable to place Sultana, start low  dose Pitocin drip overnight then increase per protocol @@ 5 am.    20 17220  Initiate Group Beta Strep (GBS) Prophylaxis Protocol, If Criteria Met  Continuous,   Status:  Canceled     Comments: NO TREATMENT RECOMMENDED IF: 1)  Maternal GBS status known negative 2)  Scheduled  birth with intact membranes, not in labor.  3 ) Maternal GBS unknown, no risk factors.   TREAT WITH ANTIBIOTICS IF:  1)  Maternal GBS status is known postive.  2)  Maternal GBS status unknown with these risk factors:  a)  Previous infant affected by GBS infection.  b)  GBS urinary tract infection (UTI) or bacteruria during pregnancy  c)  Unexplained maternal fever in labor (greater than or equal to 100.4F or 38.0C)  d)  Prolonged rupture of the membranes greater than or equal to 18 hours.  e)  Gestational age less than 37 weeks.    20 17220  Notify Physician (specified)  Until Discontinued,   Status:  Canceled      20 1724    20  Notify physician for tachysystole (per hospital algorithm)  Until Discontinued,   Status:  Canceled      20 1724    20  Notify physician if membranes ruptured, bleeding greater than 1 pad an hour, fetal heart tone abnormality, and severe pain  Until Discontinued,   Status:  Canceled      20 1724    20  Type & Screen  Once      20 17220  Up Ad Valerie  Until Discontinued,   Status:  Canceled      20 1724    20 172  Vital Signs Per hospital policy  Per Hospital Policy,   Status:  Canceled      20 1724    20 172  Maintain Sequential Compression Device  Continuous,   Status:  Canceled      20 1724    20 172  Place Sequential Compression Device  Once,   Status:  Canceled      20 1724    20  ondansetron (ZOFRAN) tablet 4 mg  Every 6 Hours PRN,   Status:  Discontinued      20 17220  ondansetron (ZOFRAN) injection 4 mg  Every 6  Hours PRN,   Status:  Discontinued      11/03/20 1724    11/03/20 1724  terbutaline (BRETHINE) injection 0.25 mg  As Needed,   Status:  Discontinued      11/03/20 1724    11/03/20 1724  Position change  As Needed,   Status:  Canceled     Comments: For intra-uterine resuscitation for hypertonus, hypertstimulation, or non-reassuring fetal status    11/03/20 1724    11/03/20 1724  lidocaine PF 1% (XYLOCAINE) injection 5 mL  As Needed,   Status:  Discontinued      11/03/20 1724    11/03/20 1724  sodium chloride 0.9 % flush 1-10 mL  As Needed,   Status:  Discontinued      11/03/20 1724    11/03/20 1724  acetaminophen (TYLENOL) tablet 650 mg  Every 4 Hours PRN,   Status:  Discontinued      11/03/20 1724    11/03/20 1724  butorphanol (STADOL) injection 1 mg  Every 2 Hours PRN,   Status:  Discontinued      11/03/20 1724    11/03/20 0000  Group B Streptococcus Culture - Swab, Vaginal/Rectum     Comments: This is an external result entered through the Results Console.      11/03/20 1910    Unscheduled  Apply Ice to Perineum  As Needed     Comments: For 20 min q 2 hrs    11/04/20 1226    Unscheduled  Waffle Cushion  As Needed     Comments: For perineal discomfort    11/04/20 1226    Unscheduled  Donut Ring  As Needed     Comments: For perineal pain    11/04/20 1226    Unscheduled  Kpad  As Needed     Comments: For pain    11/04/20 1226    Unscheduled  Warm compress  As Needed      11/04/20 1226    Unscheduled  Apply ace wrap, tight bra, or binder  As Needed      11/04/20 1226    Unscheduled  Apply ice packs  As Needed      11/04/20 1226    --  SCANNED - LABS      11/03/20 0000                   Operative/Procedure Notes (last 24 hours) (Notes from 11/03/20 1302 through 11/04/20 1302)      Aba Torres, GAUDENCIO at 11/04/20 1032          [facility]  Vaginal Delivery Note    Delivery     Delivery: Vaginal, Spontaneous     YOB: 2018    Time of Birth:  Gestational Age 10:13 AM   39w4d     Anesthesia: Epidural      Delivering clinician:     Forceps?   No   Vacuum? No    Shoulder dystocia present: No        Delivery narrative:  Patient at 39w4d pushed to deliver a viable male infant over an intact perineum without anesthesia. Infant's head, anterior shoulder, and body delivered atraumatically. Vigorous infant was placed on mom's abdomen where the cord was miljed x4, clamped x2 and cut by FOB after a 2-minute delay. Placenta delivered spontaneously and appeared to be intact. EBL 150ml. Mother and infant stable, bonding.         Infant    Findings: male  infant     Infant observations: Weight: 3490 g (7 lb 11.1 oz)   Length: 19.5  in  Observations/Comments:        Apgars: 8  @ 1 minute /    9  @ 5 minutes   Infant Name: [place]     Placenta, Cord, and Fluid    Placenta delivered  Spontaneous  at   2020 10:19 AM     Cord: 3 vessels  present.   Nuchal Cord?  no   Cord blood obtained: Yes    Cord gases obtained:  No              Repair    Episiotomy: None         Lacerations: No   Estimated Blood Loss:  150ml           Complications  none    Disposition  Mother to Mother Baby/Postpartum  in stable condition currently.  Baby to remains with mom  in stable condition currently.      Aba Torres CNM  20  10:32 EST        Electronically signed by Aba Torres CNM at 20 1033          Physician Progress Notes (last 24 hours) (Notes from 20 1302 through 20 1302)      Cuba Ellison DO at 20 1906          30 y.o.  at 39w   OB History        4    Para   1    Term   1       0    AB   2    Living   1       SAB   0    TAB   0    Ectopic   0    Molar   0    Multiple   0    Live Births   1          Obstetric Comments   6 weeks pregnant          Presents as an induction of labor due to elective induction, unfavorable cervix  Her primary OB requests a Sultana Bulb placement to initiate the induction of labor.    Fetal Heart Rate Assessment   Method:     Beats/min:     Baseline:      Varibility:     Accels:     Decels:     Tracing Category:       TOCO  SVE closed, 50%, -2 vertex    A Sultana Bulb was placed without difficulties with 60 mL of sterile saline.  The patient tolerated the procedure well.    Electronically signed by [place] Ellison DO at 11/03/20 9818

## 2020-11-04 NOTE — PLAN OF CARE
Problem: Bleeding (Labor)  Goal: Hemostasis  Outcome: Met     Problem: Change in Fetal Wellbeing (Labor)  Goal: Stable Fetal Wellbeing  Outcome: Met     Problem: Change in Fetal Wellbeing (Labor)  Goal: Stable Fetal Wellbeing  Intervention: Promote and Monitor Fetal Wellbeing  Recent Flowsheet Documentation  Taken 11/4/2020 1052 by Iram Rene, RN  Body Position: sitting up in bed  Taken 11/4/2020 1030 by Iram Rene, RN  Body Position: sitting up in bed  Taken 11/4/2020 0738 by Iram Rene, RN  Body Position: side-lying, right     Problem: Delayed Labor Progression (Labor)  Goal: Effective Progression to Delivery  Outcome: Met     Problem: Infection (Labor)  Goal: Absence of Infection Signs and Symptoms  Outcome: Met     Problem: Labor Pain (Labor)  Goal: Acceptable Pain Control  Outcome: Met     Problem: Uterine Tachysystole (Labor)  Goal: Normal Uterine Contraction Pattern  Outcome: Met   Goal Outcome Evaluation:

## 2020-11-04 NOTE — LACTATION NOTE
11/04/20 1600   Maternal Information   Person Making Referral other (see comments)  (Courtesy visit, new patient to floor. BF teaching done)   Maternal Reason for Referral other (see comments)  (reports baby nursed well in )   Infant Reason for Referral other (see comments)  (Reminded of demand feeding and proper position & latch)   Milk Expression/Equipment   Breast Pump Type double electric, personal

## 2020-11-04 NOTE — PROGRESS NOTES
30 y.o.  at 39w   OB History        4    Para   1    Term   1       0    AB   2    Living   1       SAB   0    TAB   0    Ectopic   0    Molar   0    Multiple   0    Live Births   1          Obstetric Comments   6 weeks pregnant          Presents as an induction of labor due to elective induction, unfavorable cervix  Her primary OB requests a Sultana Bulb placement to initiate the induction of labor.    Fetal Heart Rate Assessment   Method:     Beats/min:     Baseline:     Varibility:     Accels:     Decels:     Tracing Category:       TOCO  SVE closed, 50%, -2 vertex    A Sultana Bulb was placed without difficulties with 60 mL of sterile saline.  The patient tolerated the procedure well.

## 2020-11-04 NOTE — L&D DELIVERY NOTE
[facility]  Vaginal Delivery Note    Delivery     Delivery: Vaginal, Spontaneous     YOB: 2018    Time of Birth:  Gestational Age 10:13 AM   39w4d     Anesthesia: Epidural     Delivering clinician:     Forceps?   No   Vacuum? No    Shoulder dystocia present: No        Delivery narrative:  Patient at 39w4d pushed to deliver a viable male infant over an intact perineum without anesthesia. Infant's head, anterior shoulder, and body delivered atraumatically. Vigorous infant was placed on mom's abdomen where the cord was miljed x4, clamped x2 and cut by FOB after a 2-minute delay. Placenta delivered spontaneously and appeared to be intact. EBL 150ml. Mother and infant stable, bonding.         Infant    Findings: male  infant     Infant observations: Weight: 3490 g (7 lb 11.1 oz)   Length: 19.5  in  Observations/Comments:        Apgars: 8  @ 1 minute /    9  @ 5 minutes   Infant Name: Divide     Placenta, Cord, and Fluid    Placenta delivered  Spontaneous  at   11/4/2020 10:19 AM     Cord: 3 vessels  present.   Nuchal Cord?  no   Cord blood obtained: Yes    Cord gases obtained:  No              Repair    Episiotomy: None         Lacerations: No   Estimated Blood Loss:  150ml           Complications  none    Disposition  Mother to Mother Baby/Postpartum  in stable condition currently.  Baby to remains with mom  in stable condition currently.      Aba Torres CNM  11/04/20  10:32 EST

## 2020-11-05 LAB
BASOPHILS # BLD AUTO: 0.07 10*3/MM3 (ref 0–0.2)
BASOPHILS NFR BLD AUTO: 0.6 % (ref 0–1.5)
DEPRECATED RDW RBC AUTO: 46.6 FL (ref 37–54)
EOSINOPHIL # BLD AUTO: 0.2 10*3/MM3 (ref 0–0.4)
EOSINOPHIL NFR BLD AUTO: 1.8 % (ref 0.3–6.2)
ERYTHROCYTE [DISTWIDTH] IN BLOOD BY AUTOMATED COUNT: 13.2 % (ref 12.3–15.4)
HCT VFR BLD AUTO: 30.8 % (ref 34–46.6)
HGB BLD-MCNC: 10.1 G/DL (ref 12–15.9)
IMM GRANULOCYTES # BLD AUTO: 0.11 10*3/MM3 (ref 0–0.05)
IMM GRANULOCYTES NFR BLD AUTO: 1 % (ref 0–0.5)
LYMPHOCYTES # BLD AUTO: 1.42 10*3/MM3 (ref 0.7–3.1)
LYMPHOCYTES NFR BLD AUTO: 12.9 % (ref 19.6–45.3)
MCH RBC QN AUTO: 31.8 PG (ref 26.6–33)
MCHC RBC AUTO-ENTMCNC: 32.8 G/DL (ref 31.5–35.7)
MCV RBC AUTO: 96.9 FL (ref 79–97)
MONOCYTES # BLD AUTO: 0.91 10*3/MM3 (ref 0.1–0.9)
MONOCYTES NFR BLD AUTO: 8.3 % (ref 5–12)
NEUTROPHILS NFR BLD AUTO: 75.4 % (ref 42.7–76)
NEUTROPHILS NFR BLD AUTO: 8.26 10*3/MM3 (ref 1.7–7)
NRBC BLD AUTO-RTO: 0 /100 WBC (ref 0–0.2)
PLATELET # BLD AUTO: 213 10*3/MM3 (ref 140–450)
PMV BLD AUTO: 10.2 FL (ref 6–12)
RBC # BLD AUTO: 3.18 10*6/MM3 (ref 3.77–5.28)
WBC # BLD AUTO: 10.97 10*3/MM3 (ref 3.4–10.8)

## 2020-11-05 PROCEDURE — 85025 COMPLETE CBC W/AUTO DIFF WBC: CPT | Performed by: ADVANCED PRACTICE MIDWIFE

## 2020-11-05 RX ORDER — ACETAMINOPHEN 325 MG/1
650 TABLET ORAL EVERY 6 HOURS PRN
Status: DISCONTINUED | OUTPATIENT
Start: 2020-11-05 | End: 2020-11-06 | Stop reason: HOSPADM

## 2020-11-05 RX ADMIN — IBUPROFEN 600 MG: 600 TABLET, FILM COATED ORAL at 12:39

## 2020-11-05 RX ADMIN — IBUPROFEN 600 MG: 600 TABLET, FILM COATED ORAL at 05:35

## 2020-11-05 RX ADMIN — DOCUSATE SODIUM 100 MG: 100 CAPSULE ORAL at 20:12

## 2020-11-05 RX ADMIN — DOCUSATE SODIUM 100 MG: 100 CAPSULE ORAL at 08:37

## 2020-11-05 RX ADMIN — ACETAMINOPHEN 650 MG: 325 TABLET, FILM COATED ORAL at 09:07

## 2020-11-05 RX ADMIN — IBUPROFEN 600 MG: 600 TABLET, FILM COATED ORAL at 18:17

## 2020-11-05 RX ADMIN — PRENATAL VITAMINS-IRON FUMARATE 27 MG IRON-FOLIC ACID 0.8 MG TABLET 1 TABLET: at 08:37

## 2020-11-05 RX ADMIN — ACETAMINOPHEN 650 MG: 325 TABLET, FILM COATED ORAL at 16:42

## 2020-11-05 NOTE — PROGRESS NOTES
11/5/2020  PPD #1    Subjective   Lillie feels well.  Patient describes her lochia as less than menses.  Pain is well controlled       Objective   Temp: Temp:  [97.4 °F (36.3 °C)-98.6 °F (37 °C)] 97.4 °F (36.3 °C) Temp src: Oral   BP: BP: ()/(54-72) 101/66        Pulse: Heart Rate:  [] 54  RR: Resp:  [16-18] 16    General:  No acute distress   Abdomen: Fundus firm and beneath umbilicus   Pelvis: deferred     Lab Results   Component Value Date    WBC 10.97 (H) 11/05/2020    HGB 10.1 (L) 11/05/2020    HCT 30.8 (L) 11/05/2020    MCV 96.9 11/05/2020     11/05/2020    ABORH O Rh Positive 04/04/2014    HEPBSAG Non-Reactive 10/12/2020    AST 14 03/16/2020       Assessment  1. PPD# 1 after vaginal delivery    Plan  1. Supportive care, anticipate d/c in am.      This note has been electronically signed.    Isaura Smyth MD  08:21 EST  November 5, 2020

## 2020-11-06 VITALS
WEIGHT: 145 LBS | HEIGHT: 62 IN | SYSTOLIC BLOOD PRESSURE: 116 MMHG | TEMPERATURE: 97.8 F | BODY MASS INDEX: 26.68 KG/M2 | DIASTOLIC BLOOD PRESSURE: 56 MMHG | RESPIRATION RATE: 16 BRPM | HEART RATE: 58 BPM

## 2020-11-06 RX ORDER — IBUPROFEN 600 MG/1
600 TABLET ORAL EVERY 6 HOURS PRN
Qty: 30 TABLET | Refills: 0 | Status: SHIPPED | OUTPATIENT
Start: 2020-11-06 | End: 2021-03-08

## 2020-11-06 RX ORDER — PRENATAL WITH FERROUS FUM AND FOLIC ACID 3080; 920; 120; 400; 22; 1.84; 3; 20; 10; 1; 12; 200; 27; 25; 2 [IU]/1; [IU]/1; MG/1; [IU]/1; MG/1; MG/1; MG/1; MG/1; MG/1; MG/1; UG/1; MG/1; MG/1; MG/1; MG/1
1 TABLET ORAL DAILY
Qty: 90 TABLET | Refills: 3 | Status: SHIPPED | OUTPATIENT
Start: 2020-11-06 | End: 2021-03-08

## 2020-11-06 RX ORDER — PSEUDOEPHEDRINE HCL 30 MG
100 TABLET ORAL 2 TIMES DAILY
Qty: 30 CAPSULE | Refills: 0 | Status: SHIPPED | OUTPATIENT
Start: 2020-11-06 | End: 2021-03-08

## 2020-11-06 RX ADMIN — PRENATAL VITAMINS-IRON FUMARATE 27 MG IRON-FOLIC ACID 0.8 MG TABLET 1 TABLET: at 09:29

## 2020-11-06 RX ADMIN — Medication: at 12:06

## 2020-11-06 RX ADMIN — IBUPROFEN 600 MG: 600 TABLET, FILM COATED ORAL at 12:08

## 2020-11-06 RX ADMIN — DOCUSATE SODIUM 100 MG: 100 CAPSULE ORAL at 09:29

## 2020-11-06 RX ADMIN — IBUPROFEN 600 MG: 600 TABLET, FILM COATED ORAL at 05:54

## 2020-11-06 NOTE — PAYOR COMM NOTE
"Cristina Dos Santos (30 y.o. Female)     Auth#[id number]    Discharged 11/6/2020 with Baby.    Needs 2 additional days approved.    From: Monisha Aggarwal  #[phone]  Fax#[phone]      Date of Birth Social Security Number Address Home Phone MRN    [id number]  [id number] [phone] [id number]    M[employer]on Marital Status          Episcopal        Admission Date Admission Type Admitting Provider Attending Provider Department, Room/Bed    11/3/20 Elective Isaura Smyth MD  [facility] MOTHER BABY 4A, N416/1    Discharge Date Discharge Disposition Discharge Destination        11/6/2020 Home or Self Care              Attending Provider: (none)   Allergies: Amoxicillin, Penicillins    Isolation: None   Infection: None   Code Status: CPR    Ht: 157.5 cm (62\")   Wt: 65.8 kg (145 lb)    Admission Cmt: None   Principal Problem: None                Active Insurance as of 11/3/2020     Primary Coverage     Payor Plan Insurance Group Employer/Plan Group    [employer] [place] [employer] [place]ICA[place]      Payor Plan Address Payor Plan Phone Number Payor Plan Fax Number Effective Dates    [address] [address] [phone]  8/25/2016 - None Entered    [facility] [id number]       Subscriber Name Subscriber Birth Date Member ID       CRISTINA DOS SANTOS 1990 [id number]                 Emergency Contacts      (Rel.) Home Phone Work Phone Mobile Phone    Raffaele Spears (Significant Other) [phone] -- --    Nasima Dos Santos (Sister) -- -- [phone]            Orders (last 72 hrs)      Start     Ordered    11/06/20 0833  Discharge patient  Once      11/06/20 0833    11/06/20 0000  Prenatal Vit-Fe Fumarate-FA (Prenatal 27-1) 27-1 MG tablet tablet  Daily      11/06/20 0833    11/06/20 0000  docusate sodium 100 MG capsule  2 Times Daily      11/06/20 0833    11/06/20 0000  ibuprofen (ADVIL,MOTRIN) 600 MG tablet  Every 6 Hours PRN      11/06/20 0833    11/06/20 0000  Discharge " Follow-up with Specified Provider: borders; 6 Weeks      11/06/20 0833    11/05/20 0839  acetaminophen (TYLENOL) tablet 650 mg  Every 6 Hours PRN      11/05/20 0839    11/05/20 0800  Sitz Bath  3 Times Daily     Comments: PRN    11/04/20 1226    11/05/20 0600  CBC & Differential  Timed     Comments: Postpartum Day 1      11/04/20 1226    11/05/20 0600  CBC Auto Differential  PROCEDURE ONCE     Comments: Postpartum Day 1      11/05/20 0002    11/05/20 0000  bisacodyl (DULCOLAX) suppository 10 mg  Daily PRN      11/04/20 1226    11/04/20 1315  docusate sodium (COLACE) capsule 100 mg  2 Times Daily      11/04/20 1226    11/04/20 1315  prenatal vitamin tablet 1 tablet  Daily      11/04/20 1226    11/04/20 1227  Transfer Patient  Once      11/04/20 1226    11/04/20 1227  Code Status and Medical Interventions:  Continuous      11/04/20 1226    11/04/20 1227  Vital Signs Per hospital policy  Per Hospital Policy      11/04/20 1226    11/04/20 1227  Up Ad Valerie  Until Discontinued      11/04/20 1226    11/04/20 1227  Ambulate Patient  Every Shift      11/04/20 1226    11/04/20 1227  Fundal and Lochia Check  Per Hospital Policy     Comments: Q 15 min x 4, Q 30 min x 2, then Q Shift    11/04/20 1226    11/04/20 1227  RN to Assess Rh Status & Place RhIG Evaluation Order if Indicated  Continuous      11/04/20 1226    11/04/20 1227  Bladder Assessment  Per Order Details     Comments: Postpartum 1) Upon Admission to Unit & Every 4 Hours PRN Until Voiding. 2) Out of Bed to Void in 8 Hours.    11/04/20 1226    11/04/20 1227  Straight Cath  Per Order Details     Comments: Postpartum: If Distended & Unable to Void, May Repeat Once.    11/04/20 1226    11/04/20 1227  Indwelling Urinary Catheter  Per Order Details     Comments: Postpartum : After Straight Cathed x2 or if Greater Than 1000mL Residual, Insert Indwelling Urinary Catheter Until Further MD Order.    11/04/20 1226    11/04/20 1227  Breast pump to bed  Once      11/04/20 2209     11/04/20 1227  Notify Physician  Until Discontinued      11/04/20 1226    11/04/20 1227  Diet Regular  Diet Effective Now      11/04/20 1226    11/04/20 1227  Advance Diet as Tolerated  Until Discontinued      11/04/20 1226    11/04/20 1227  If indicated -- Please administer RH Immunoglobulin based on results of cord blood evaluation and fetal screen lab tests, pharmacy to dispense  Per Order Details     Comments: See Process Instructions For Reference Range Details.    11/04/20 1226    11/04/20 1226  ibuprofen (ADVIL,MOTRIN) tablet 600 mg  Every 6 Hours PRN      11/04/20 1226    11/04/20 1226  magnesium hydroxide (MILK OF MAGNESIA) suspension 2400 mg/10mL 10 mL  Daily PRN      11/04/20 1226    11/04/20 1226  ondansetron (ZOFRAN) injection 4 mg  Every 6 Hours PRN      11/04/20 1226    11/04/20 1226  lanolin cream  Every 1 Hour PRN      11/04/20 1226    11/04/20 1226  benzocaine-menthol (DERMOPLAST) 20-0.5 % topical spray  As Needed      11/04/20 1226    11/04/20 1226  witch hazel-glycerin (TUCKS) pad 1 pad  As Needed      11/04/20 1226    11/04/20 1226  Hydrocortisone (Perianal) (ANUSOL-HC) 2.5 % rectal cream 1 application  As Needed      11/04/20 1226    11/04/20 1226  benzocaine (AMERICAINE) 20 % rectal ointment 1 application  As Needed      11/04/20 1226    11/04/20 1226  sodium chloride 0.9 % flush 1-10 mL  As Needed      11/04/20 1226    11/04/20 1146  HYDROcodone-acetaminophen (NORCO) 5-325 MG per tablet 1 tablet  Once As Needed      11/04/20 1146    11/04/20 1115  oxytocin in sodium chloride (PITOCIN) 30 UNIT/500ML infusion solution  Once      11/04/20 1027    11/04/20 1115  oxytocin in sodium chloride (PITOCIN) 30 UNIT/500ML infusion solution  Once,   Status:  Discontinued      11/04/20 1027    11/04/20 1035  DIET MESSAGE Please send chicken tenders and french fries, thank you.  Once     Comments: Please send chicken tenders and french fries, thank you.    11/04/20 1034    11/04/20 1032  VTE Prophylaxis Not  Indicated: No Risk Factors (0); </= 3 (Low Risk)  Once      11/04/20 1031    11/04/20 1028  Fundal & Lochia Check  Per Order Details,   Status:  Canceled     Comments: Every 15 Minutes x4, Then Every 30 Minutes x2, Then Every Shift    11/04/20 1027    11/04/20 1028  Notify Physician (specified)  Until Discontinued,   Status:  Canceled      11/04/20 1027    11/04/20 1028  Vital Signs Per hospital policy  Per Hospital Policy,   Status:  Canceled      11/04/20 1027    11/04/20 1028  Diet Regular  Diet Effective Now,   Status:  Canceled      11/04/20 1027    11/04/20 1027  Apply Ice to Perineum  As Needed,   Status:  Canceled      11/04/20 1027    11/04/20 1027  Fundal & Lochia Check  Every Shift,   Status:  Canceled      11/04/20 1027    11/04/20 1027  carboprost (HEMABATE) injection 250 mcg  As Needed,   Status:  Discontinued      11/04/20 1027    11/04/20 1027  ibuprofen (ADVIL,MOTRIN) tablet 600 mg  Every 6 Hours PRN,   Status:  Discontinued      11/04/20 1027    11/04/20 1027  methylergonovine (METHERGINE) injection 200 mcg  Once As Needed,   Status:  Discontinued      11/04/20 1027    11/04/20 1027  miSOPROStol (CYTOTEC) tablet 800 mcg  As Needed,   Status:  Discontinued      11/04/20 1027    11/04/20 0500  oxytocin in sodium chloride (PITOCIN) 30 UNIT/500ML infusion solution  Titrated,   Status:  Discontinued      11/03/20 1724 11/04/20 0200  ceFAZolin (ANCEF) IVPB 1 g  Every 8 Hours,   Status:  Discontinued      11/03/20 1724    11/03/20 2100  sodium chloride 0.9 % flush 3 mL  Every 12 Hours Scheduled,   Status:  Discontinued      11/03/20 1724 11/03/20 1944  famotidine (PEPCID) injection 20 mg  2 Times Daily PRN,   Status:  Discontinued      11/03/20 1944    11/03/20 1815  lactated ringers infusion  Continuous,   Status:  Discontinued      11/03/20 1724 11/03/20 1815  ceFAZolin in dextrose (ANCEF) IVPB solution 2 g  Once      11/03/20 1724    11/03/20 1815  mineral oil liquid 30 mL  Once,   Status:   Discontinued      20 1724    20 172  Admit To Obstetrics Inpatient  Once      20 1724    20 172  Code Status and Medical Interventions:  Continuous,   Status:  Canceled      20 1724    20 1725  Insert Peripheral IV  Once,   Status:  Canceled      20 1724    20 172  Saline Lock & Maintain IV Access  Continuous,   Status:  Canceled      20 1724    20 172  CBC (No Diff)  Once      20 1724    20 172  Continuous Fetal Monitoring With NST on Admission and Prior to Initiation of Oxytocin.  Per Order Details,   Status:  Canceled     Comments: Continuous Fetal Monitoring With NST on Admission & Prior to Initiation of Oxytocin.    20 1724    20 172  Diet Clear Liquid  Diet Effective Now,   Status:  Canceled      20 1724    20 172  External Uterine Contraction Monitoring  Per Hospital Policy,   Status:  Canceled      20 1724    20 1725  Sultana Bulb Cervical Ripening w/o infusion  Once,   Status:  Canceled     Comments: Have laborist place cervical Sultana w/o infusion.  If unable to place Sultana, start low dose Pitocin drip overnight then increase per protocol @@ 5 am.    20 1724    20  Initiate Group Beta Strep (GBS) Prophylaxis Protocol, If Criteria Met  Continuous,   Status:  Canceled     Comments: NO TREATMENT RECOMMENDED IF: 1)  Maternal GBS status known negative 2)  Scheduled  birth with intact membranes, not in labor.  3 ) Maternal GBS unknown, no risk factors.   TREAT WITH ANTIBIOTICS IF:  1)  Maternal GBS status is known postive.  2)  Maternal GBS status unknown with these risk factors:  a)  Previous infant affected by GBS infection.  b)  GBS urinary tract infection (UTI) or bacteruria during pregnancy  c)  Unexplained maternal fever in labor (greater than or equal to 100.4F or 38.0C)  d)  Prolonged rupture of the membranes greater than or equal to 18 hours.  e)  Gestational age less  than 37 weeks.    11/03/20 1724    11/03/20 1725  Notify Physician (specified)  Until Discontinued,   Status:  Canceled      11/03/20 1724    11/03/20 1725  Notify physician for tachysystole (per hospital algorithm)  Until Discontinued,   Status:  Canceled      11/03/20 1724    11/03/20 1725  Notify physician if membranes ruptured, bleeding greater than 1 pad an hour, fetal heart tone abnormality, and severe pain  Until Discontinued,   Status:  Canceled      11/03/20 1724    11/03/20 1725  Type & Screen  Once      11/03/20 1724    11/03/20 1725  Up Ad Valerie  Until Discontinued,   Status:  Canceled      11/03/20 1724    11/03/20 1725  Vital Signs Per hospital policy  Per Hospital Policy,   Status:  Canceled      11/03/20 1724    11/03/20 1725  Maintain Sequential Compression Device  Continuous,   Status:  Canceled      11/03/20 1724    11/03/20 1725  Place Sequential Compression Device  Once,   Status:  Canceled      11/03/20 1724    11/03/20 1724  ondansetron (ZOFRAN) tablet 4 mg  Every 6 Hours PRN,   Status:  Discontinued      11/03/20 1724    11/03/20 1724  ondansetron (ZOFRAN) injection 4 mg  Every 6 Hours PRN,   Status:  Discontinued      11/03/20 1724    11/03/20 1724  terbutaline (BRETHINE) injection 0.25 mg  As Needed,   Status:  Discontinued      11/03/20 1724    11/03/20 1724  Position change  As Needed,   Status:  Canceled     Comments: For intra-uterine resuscitation for hypertonus, hypertstimulation, or non-reassuring fetal status    11/03/20 1724    11/03/20 1724  lidocaine PF 1% (XYLOCAINE) injection 5 mL  As Needed,   Status:  Discontinued      11/03/20 1724    11/03/20 1724  sodium chloride 0.9 % flush 1-10 mL  As Needed,   Status:  Discontinued      11/03/20 1724    11/03/20 1724  acetaminophen (TYLENOL) tablet 650 mg  Every 4 Hours PRN,   Status:  Discontinued      11/03/20 1724    11/03/20 1724  butorphanol (STADOL) injection 1 mg  Every 2 Hours PRN,   Status:  Discontinued      11/03/20 4930     11/03/20 0000  Group B Streptococcus Culture - Swab, Vaginal/Rectum     Comments: This is an external result entered through the Results Console.      11/03/20 1910    Unscheduled  Apply Ice to Perineum  As Needed     Comments: For 20 min q 2 hrs    11/04/20 1226    Unscheduled  Waffle Cushion  As Needed     Comments: For perineal discomfort    11/04/20 1226    Unscheduled  Donut Ring  As Needed     Comments: For perineal pain    11/04/20 1226    Unscheduled  Kpad  As Needed     Comments: For pain    11/04/20 1226    Unscheduled  Warm compress  As Needed      11/04/20 1226    Unscheduled  Apply ace wrap, tight bra, or binder  As Needed      11/04/20 1226    Unscheduled  Apply ice packs  As Needed      11/04/20 1226    --  SCANNED - LABS      11/03/20 0000                   Operative/Procedure Notes (last 72 hours) (Notes from 11/03/20 1414 through 11/06/20 1414)      Aba Torres CNM at 11/04/20 1032          [facility]  Vaginal Delivery Note    Delivery     Delivery: Vaginal, Spontaneous     YOB: 2018    Time of Birth:  Gestational Age 10:13 AM   39w4d     Anesthesia: Epidural     Delivering clinician:     Forceps?   No   Vacuum? No    Shoulder dystocia present: No        Delivery narrative:  Patient at 39w4d pushed to deliver a viable male infant over an intact perineum without anesthesia. Infant's head, anterior shoulder, and body delivered atraumatically. Vigorous infant was placed on mom's abdomen where the cord was miljed x4, clamped x2 and cut by FOB after a 2-minute delay. Placenta delivered spontaneously and appeared to be intact. EBL 150ml. Mother and infant stable, bonding.         Infant    Findings: male  infant     Infant observations: Weight: 3490 g (7 lb 11.1 oz)   Length: 19.5  in  Observations/Comments:        Apgars: 8  @ 1 minute /    9  @ 5 minutes   Infant Name: Kristopher     Placenta, Cord, and Fluid    Placenta delivered  Spontaneous  at   11/4/2020 10:19 AM     Cord: 3  vessels  present.   Nuchal Cord?  no   Cord blood obtained: Yes    Cord gases obtained:  No              Repair    Episiotomy: None         Lacerations: No   Estimated Blood Loss:  150ml           Complications  none    Disposition  Mother to Mother Baby/Postpartum  in stable condition currently.  Baby to remains with mom  in stable condition currently.      Aba Torres CNM  11/04/20  10:32 EST        Electronically signed by Aba Torres CNM at 11/04/20 1033          Physician Progress Notes (last 72 hours) (Notes from 11/03/20 1414 through 11/06/20 1414)      Isaura Smyth MD at 11/06/20 0831          11/6/2020  PPD #2    Subjective   Lillie feels well.  Patient describes her lochia as less than menses.  Pain is well controlled       Objective   Temp: Temp:  [98.1 °F (36.7 °C)-98.5 °F (36.9 °C)] 98.1 °F (36.7 °C)     BP: BP: (104-121)/(53-65) 121/65        Pulse: Heart Rate:  [65-97] 65  RR: Resp:  [16-18] 16    General:  No acute distress   Abdomen: Fundus firm and beneath umbilicus   Pelvis: deferred     Lab Results   Component Value Date    WBC 10.97 (H) 11/05/2020    HGB 10.1 (L) 11/05/2020    HCT 30.8 (L) 11/05/2020    MCV 96.9 11/05/2020     11/05/2020    ABORH O Rh Positive 04/04/2014    HEPBSAG Non-Reactive 10/12/2020    AST 14 03/16/2020       Assessment  1. PPD# 2 after vaginal delivery    Plan  1. Discharge to home  2. [facility] up with [facility]  in 6 weeks  3. Advised no tampons, intercourse, or tub baths for 6 weeks.       This note has been electronically signed.    Isaura Smyth MD  08:31 EST  November 6, 2020     Electronically signed by Isaura Smyth MD at 11/06/20 0831     Isaura Smyth MD at 11/05/20 0821          11/5/2020  PPD #1    Subjective   Lillie feels well.  Patient describes her lochia as less than menses.  Pain is well controlled       Objective   Temp: Temp:  [97.4 °F (36.3 °C)-98.6 °F (37 °C)] 97.4 °F (36.3 °C) Temp src: Oral   BP: BP: ()/(54-74)  101/66        Pulse: Heart Rate:  [] 54  RR: Resp:  [16-18] 16    General:  No acute distress   Abdomen: Fundus firm and beneath umbilicus   Pelvis: deferred     Lab Results   Component Value Date    WBC 10.97 (H) 2020    HGB 10.1 (L) 2020    HCT 30.8 (L) 2020    MCV 96.9 2020     2020    ABORH O Rh Positive 2014    HEPBSAG Non-Reactive 10/12/2020    AST 14 2020       Assessment  2. PPD# 1 after vaginal delivery    Plan  4. Supportive care, anticipate d/c in am.      This note has been electronically signed.    Isaura Smyth MD  08:21 EST  2020     Electronically signed by Isaura Smyth MD at 20 0821     Cuba Ellison DO at 20          30 y.o.  at 39w   OB History        4    Para   1    Term   1       0    AB   2    Living   1       SAB   0    TAB   0    Ectopic   0    Molar   0    Multiple   0    Live Births   1          Obstetric Comments   6 weeks pregnant          Presents as an induction of labor due to elective induction, unfavorable cervix  Her primary OB requests a Sultana Bulb placement to initiate the induction of labor.    Fetal Heart Rate Assessment   Method:     Beats/min:     Baseline:     Varibility:     Accels:     Decels:     Tracing Category:       TOCO  SVE closed, 50%, -2 vertex    A Sultana Bulb was placed without difficulties with 60 mL of sterile saline.  The patient tolerated the procedure well.    Electronically signed by Cuba Ellison DO at 20          Discharge Summary      Isaura Smyth MD at 20 0833          [facility]  Vaginal Delivery Discharge Summary      Patient: Lillie Dos Santos      MR#:[id number]  Admission  Diagnosis: term pregnancy  Discharge Diagnosis: Same    Date of Admission: 11/3/2020  Date of Discharge:  2020    Procedures:  Vaginal, Spontaneous     2020    10:13 AM      Service:  Obstetrics    Hospital Course:  Patient  underwent vaginal delivery and remained in the hospital for 3 days.  During that time she remained afebrile and hemodynamically stable.  On the day of discharge, she was eating, ambulating and voiding without difficulty.      Lab Results   Component Value Date    WBC 10.97 (H) 11/05/2020    HGB 10.1 (L) 11/05/2020    HCT 30.8 (L) 11/05/2020    MCV 96.9 11/05/2020     11/05/2020    AST 14 03/16/2020    ALT 7 03/16/2020     Results from last 7 days   Lab Units 11/03/20  1815   ABO TYPING  O   RH TYPING  Positive   ANTIBODY SCREEN  Negative       Discharge Medications     Discharge Medications      New Medications      Instructions Start Date   docusate sodium 100 MG capsule   100 mg, Oral, 2 Times Daily      ibuprofen 600 MG tablet  Commonly known as: ADVIL,MOTRIN   600 mg, Oral, Every 6 Hours PRN         Changes to Medications      Instructions Start Date   Prenatal 27-1 27-1 MG tablet tablet  What changed: how much to take   1 tablet, Oral, Daily         Continue These Medications      Instructions Start Date   albuterol sulfate  (90 Base) MCG/ACT inhaler  Commonly known as: Ventolin HFA   2 puffs, Inhalation, Every 6 Hours PRN      budesonide-formoterol 160-4.5 MCG/ACT inhaler  Commonly known as: Symbicort   2 puffs, Inhalation, 2 Times Daily - RT      ferrous sulfate 325 (65 FE) MG tablet   No dose, route, or frequency recorded.      ZyrTEC Allergy 10 MG capsule  Generic drug: Cetirizine HCl   Oral         Stop These Medications    predniSONE 10 MG tablet  Commonly known as: DELTASONE     valACYclovir 500 MG tablet  Commonly known as: VALTREX            Discharge Disposition:  To Home    Discharge Condition:  Stable    Discharge Diet: regular    Activity at Discharge: Pelvic rest    Follow-up Appointments  6 weeks      Isaura Smyth MD  11/06/20  08:33 EST       Electronically signed by Isaura Smyth MD at 11/06/20 0871

## 2020-11-06 NOTE — DISCHARGE SUMMARY
[facility]  Vaginal Delivery Discharge Summary      Patient: Lillie Dos Santos      MR#:[id number]  Admission  Diagnosis: term pregnancy  Discharge Diagnosis: Same    Date of Admission: 11/3/2020  Date of Discharge:  11/6/2020    Procedures:  Vaginal, Spontaneous     11/4/2020    10:13 AM      Service:  Obstetrics    Hospital Course:  Patient underwent vaginal delivery and remained in the hospital for 3 days.  During that time she remained afebrile and hemodynamically stable.  On the day of discharge, she was eating, ambulating and voiding without difficulty.      Lab Results   Component Value Date    WBC 10.97 (H) 11/05/2020    HGB 10.1 (L) 11/05/2020    HCT 30.8 (L) 11/05/2020    MCV 96.9 11/05/2020     11/05/2020    AST 14 03/16/2020    ALT 7 03/16/2020     Results from last 7 days   Lab Units 11/03/20  1815   ABO TYPING  O   RH TYPING  Positive   ANTIBODY SCREEN  Negative       Discharge Medications     Discharge Medications      New Medications      Instructions Start Date   docusate sodium 100 MG capsule   100 mg, Oral, 2 Times Daily      ibuprofen 600 MG tablet  Commonly known as: ADVIL,MOTRIN   600 mg, Oral, Every 6 Hours PRN         Changes to Medications      Instructions Start Date   Prenatal 27-1 27-1 MG tablet tablet  What changed: how much to take   1 tablet, Oral, Daily         Continue These Medications      Instructions Start Date   albuterol sulfate  (90 Base) MCG/ACT inhaler  Commonly known as: Ventolin HFA   2 puffs, Inhalation, Every 6 Hours PRN      budesonide-formoterol 160-4.5 MCG/ACT inhaler  Commonly known as: Symbicort   2 puffs, Inhalation, 2 Times Daily - RT      ferrous sulfate 325 (65 FE) MG tablet   No dose, route, or frequency recorded.      ZyrTEC Allergy 10 MG capsule  Generic drug: Cetirizine HCl   Oral         Stop These Medications    predniSONE 10 MG tablet  Commonly known as: DELTASONE     valACYclovir 500 MG tablet  Commonly known as: VALTREX            Discharge  Disposition:  To Home    Discharge Condition:  Stable    Discharge Diet: regular    Activity at Discharge: Pelvic rest    Follow-up Appointments  6 weeks      Isaura Smyth MD  11/06/20  08:33 EST

## 2020-11-06 NOTE — PROGRESS NOTES
11/6/2020  PPD #2    Subjective   Lillie feels well.  Patient describes her lochia as less than menses.  Pain is well controlled       Objective   Temp: Temp:  [98.1 °F (36.7 °C)-98.5 °F (36.9 °C)] 98.1 °F (36.7 °C)     BP: BP: (104-121)/(53-65) 121/65        Pulse: Heart Rate:  [65-97] 65  RR: Resp:  [16-18] 16    General:  No acute distress   Abdomen: Fundus firm and beneath umbilicus   Pelvis: deferred     Lab Results   Component Value Date    WBC 10.97 (H) 11/05/2020    HGB 10.1 (L) 11/05/2020    HCT 30.8 (L) 11/05/2020    MCV 96.9 11/05/2020     11/05/2020    ABORH O Rh Positive 04/04/2014    HEPBSAG Non-Reactive 10/12/2020    AST 14 03/16/2020       Assessment  1. PPD# 2 after vaginal delivery    Plan  1. Discharge to home  2. Follow up with LW  in 6 weeks  3. Advised no tampons, intercourse, or tub baths for 6 weeks.       This note has been electronically signed.    Isaura Smyth MD  08:31 EST  November 6, 2020

## 2021-03-08 ENCOUNTER — LAB (OUTPATIENT)
Dept: LAB | Facility: HOSPITAL | Age: 31
End: 2021-03-08

## 2021-03-08 ENCOUNTER — OFFICE VISIT (OUTPATIENT)
Dept: INTERNAL MEDICINE | Facility: CLINIC | Age: 31
End: 2021-03-08

## 2021-03-08 VITALS
HEART RATE: 67 BPM | WEIGHT: 123.6 LBS | BODY MASS INDEX: 22.74 KG/M2 | DIASTOLIC BLOOD PRESSURE: 70 MMHG | TEMPERATURE: 98.4 F | SYSTOLIC BLOOD PRESSURE: 120 MMHG | HEIGHT: 62 IN | OXYGEN SATURATION: 99 % | RESPIRATION RATE: 16 BRPM

## 2021-03-08 DIAGNOSIS — Z13.220 SCREENING, LIPID: ICD-10-CM

## 2021-03-08 DIAGNOSIS — Z13.1 SCREENING FOR DIABETES MELLITUS: ICD-10-CM

## 2021-03-08 DIAGNOSIS — Z00.00 ANNUAL PHYSICAL EXAM: ICD-10-CM

## 2021-03-08 DIAGNOSIS — Z00.00 ANNUAL PHYSICAL EXAM: Primary | ICD-10-CM

## 2021-03-08 DIAGNOSIS — F41.9 ANXIETY: ICD-10-CM

## 2021-03-08 DIAGNOSIS — J30.89 NON-SEASONAL ALLERGIC RHINITIS, UNSPECIFIED TRIGGER: ICD-10-CM

## 2021-03-08 DIAGNOSIS — J45.20 MILD INTERMITTENT ASTHMA WITHOUT COMPLICATION: ICD-10-CM

## 2021-03-08 LAB
ALBUMIN SERPL-MCNC: 4.9 G/DL (ref 3.5–5.2)
ALBUMIN/GLOB SERPL: 1.8 G/DL
ALP SERPL-CCNC: 75 U/L (ref 39–117)
ALT SERPL W P-5'-P-CCNC: 21 U/L (ref 1–33)
ANION GAP SERPL CALCULATED.3IONS-SCNC: 10.7 MMOL/L (ref 5–15)
AST SERPL-CCNC: 57 U/L (ref 1–32)
BILIRUB SERPL-MCNC: 0.3 MG/DL (ref 0–1.2)
BUN SERPL-MCNC: 18 MG/DL (ref 6–20)
BUN/CREAT SERPL: 17.8 (ref 7–25)
CALCIUM SPEC-SCNC: 9.5 MG/DL (ref 8.6–10.5)
CHLORIDE SERPL-SCNC: 101 MMOL/L (ref 98–107)
CHOLEST SERPL-MCNC: 216 MG/DL (ref 0–200)
CO2 SERPL-SCNC: 27.3 MMOL/L (ref 22–29)
CREAT SERPL-MCNC: 1.01 MG/DL (ref 0.57–1)
DEPRECATED RDW RBC AUTO: 40.5 FL (ref 37–54)
ERYTHROCYTE [DISTWIDTH] IN BLOOD BY AUTOMATED COUNT: 12.2 % (ref 12.3–15.4)
GFR SERPL CREATININE-BSD FRML MDRD: 65 ML/MIN/1.73
GLOBULIN UR ELPH-MCNC: 2.8 GM/DL
GLUCOSE SERPL-MCNC: 57 MG/DL (ref 65–99)
HBA1C MFR BLD: 4.92 % (ref 4.8–5.6)
HCT VFR BLD AUTO: 41.1 % (ref 34–46.6)
HDLC SERPL-MCNC: 81 MG/DL (ref 40–60)
HGB BLD-MCNC: 13.8 G/DL (ref 12–15.9)
LDLC SERPL CALC-MCNC: 118 MG/DL (ref 0–100)
LDLC/HDLC SERPL: 1.43 {RATIO}
MCH RBC QN AUTO: 30.7 PG (ref 26.6–33)
MCHC RBC AUTO-ENTMCNC: 33.6 G/DL (ref 31.5–35.7)
MCV RBC AUTO: 91.5 FL (ref 79–97)
PLATELET # BLD AUTO: 335 10*3/MM3 (ref 140–450)
PMV BLD AUTO: 9.9 FL (ref 6–12)
POTASSIUM SERPL-SCNC: 4 MMOL/L (ref 3.5–5.2)
PROT SERPL-MCNC: 7.7 G/DL (ref 6–8.5)
RBC # BLD AUTO: 4.49 10*6/MM3 (ref 3.77–5.28)
SODIUM SERPL-SCNC: 139 MMOL/L (ref 136–145)
TRIGL SERPL-MCNC: 97 MG/DL (ref 0–150)
VLDLC SERPL-MCNC: 17 MG/DL (ref 5–40)
WBC # BLD AUTO: 5.99 10*3/MM3 (ref 3.4–10.8)

## 2021-03-08 PROCEDURE — 80061 LIPID PANEL: CPT

## 2021-03-08 PROCEDURE — 85027 COMPLETE CBC AUTOMATED: CPT

## 2021-03-08 PROCEDURE — 80053 COMPREHEN METABOLIC PANEL: CPT

## 2021-03-08 PROCEDURE — 99395 PREV VISIT EST AGE 18-39: CPT | Performed by: INTERNAL MEDICINE

## 2021-03-08 PROCEDURE — 83036 HEMOGLOBIN GLYCOSYLATED A1C: CPT

## 2021-03-08 NOTE — PROGRESS NOTES
Internal Medicine Annual Exam  Lillie Dos Santos is a 30 y.o. female who presents today for an annual exam and with concerns as outlined below.    Chief Complaint  Chief Complaint   Patient presents with   • Annual Exam        HPI  Ms. Dos Santos comes in today for physical. She brings her 4 month old son with her today. She is doing well at this time. She was started on sertraline 50mg daily for anxiety by her GYN, Dr. Smyth at her 6 week visit. She believes this has helped along with being less isolated since he has had his vaccinations. She does note also that they had COVID after Niranjan but recovered. She hopes warmer weather is going to help her mood as well. She has gone back to the gym 2 weeks ago and diet is well balanced. She is breastfeeding. Symbicort is working very well, she likes it better than she did the advair. She uses albuterol before exercising but otherwise has not required albuterol more than 1-2 times a month with exception of when she had COVID. No tobacco, alcohol, drug use. She has used an ecig 2-3 times in the last 3 months. She needs vision and dental exams. Flu and Tdap UTD, defers pneumovax today. She reports pap smear at 6 week post delivery visit that was normal.       Review of Systems  Review of Systems   Constitutional: Negative.    HENT: Negative for trouble swallowing.    Eyes: Negative.    Respiratory: Negative.    Cardiovascular: Negative.    Gastrointestinal: Negative.    Genitourinary: Negative.    Musculoskeletal: Negative.    Skin: Negative.    Allergic/Immunologic: Positive for environmental allergies.   Neurological: Negative.    Psychiatric/Behavioral: Positive for stress (improving). Negative for depressed mood. The patient is nervous/anxious (improving).         Past Medical History  Past Medical History:   Diagnosis Date   • Abnormal Pap smear of cervix    • Asthma    • Depression 8/24/2016     start prozac 10 mg po qd   • Esophagitis, reflux    • Headache    • HSV-2  infection    • Hypoglycemia    • Proteinuria 2016   • Seizures (CMS/HCC)     had 1 or 2 -in teens, unsure of etiology    • UTI (urinary tract infection)         Surgical History  Past Surgical History:   Procedure Laterality Date   • COLPOSCOPY      after first pregnancy    • DENTAL PROCEDURE     • FINGER SURGERY Right     forefinger   • VAGINAL DELIVERY     • WISDOM TOOTH EXTRACTION          Family History  Family History   Problem Relation Age of Onset   • ALS Father    • Diabetes Other         maternal family members   • Alcohol abuse Mother    • Hyperlipidemia Maternal Grandfather    • Hypertension Maternal Grandfather    • Melanoma Maternal Grandfather    • Cancer Paternal Grandmother         uncertain what type, maybe ovarian        Social History  Social History     Socioeconomic History   • Marital status:      Spouse name: Raffaele Spears    • Number of children: 1   • Years of education: Not on file   • Highest education level: Associate degree: occupational, technical, or vocational program   Tobacco Use   • Smoking status: Former Smoker     Years: 2.00     Types: Cigarettes     Quit date: 2012     Years since quittin.1   • Smokeless tobacco: Never Used   • Tobacco comment: 1 pack every 2 weeks for 2 years during highschool   Vaping Use   • Vaping Use: Never used   Substance and Sexual Activity   • Alcohol use: Not Currently     Comment: occasionally, when not pregnant   • Drug use: No   • Sexual activity: Yes     Partners: Male        Current Medications  Current Outpatient Medications on File Prior to Visit   Medication Sig Dispense Refill   • albuterol sulfate HFA (VENTOLIN HFA) 108 (90 Base) MCG/ACT inhaler Inhale 2 puffs Every 6 (Six) Hours As Needed for Wheezing or Shortness of Air. 1 inhaler 5   • budesonide-formoterol (SYMBICORT) 160-4.5 MCG/ACT inhaler Inhale 2 puffs 2 (Two) Times a Day. 1 inhaler 5   • Cetirizine HCl (ZYRTEC ALLERGY) 10 MG capsule Take  by mouth.     • ibuprofen  "(ADVIL,MOTRIN) 600 MG tablet Take 1 tablet by mouth Every 6 (Six) Hours As Needed for Mild Pain . 30 tablet 0   • docusate sodium 100 MG capsule Take 1 capsule by mouth 2 (Two) Times a Day. 30 capsule 0   • ferrous sulfate 325 (65 FE) MG tablet      • Prenatal Vit-Fe Fumarate-FA (Prenatal 27-1) 27-1 MG tablet tablet Take 1 tablet by mouth Daily. 90 tablet 3     No current facility-administered medications on file prior to visit.       Allergies  Allergies   Allergen Reactions   • Amoxicillin GI Intolerance   • Penicillins GI Intolerance        Objective  Visit Vitals  /70   Pulse 67   Temp 98.4 °F (36.9 °C)   Resp 16   Ht 157.5 cm (62.01\")   Wt 56.1 kg (123 lb 9.6 oz)   SpO2 99%   BMI 22.60 kg/m²        Physical Exam  Physical Exam  Vitals and nursing note reviewed.   Constitutional:       General: She is not in acute distress.     Appearance: Normal appearance. She is well-developed and normal weight. She is not ill-appearing or diaphoretic.   HENT:      Head: Normocephalic and atraumatic.      Right Ear: Tympanic membrane, ear canal and external ear normal.      Left Ear: Tympanic membrane, ear canal and external ear normal.      Nose: Nose normal.   Eyes:      General: No scleral icterus.     Extraocular Movements: Extraocular movements intact.      Conjunctiva/sclera: Conjunctivae normal.      Pupils: Pupils are equal, round, and reactive to light.   Cardiovascular:      Rate and Rhythm: Normal rate and regular rhythm.      Heart sounds: Normal heart sounds. No murmur.   Pulmonary:      Effort: Pulmonary effort is normal. No respiratory distress.      Breath sounds: Normal breath sounds. No wheezing.   Abdominal:      General: Bowel sounds are normal. There is no distension.      Palpations: Abdomen is soft.      Tenderness: There is no abdominal tenderness.   Musculoskeletal:         General: No deformity.      Cervical back: Neck supple.      Right lower leg: No edema.      Left lower leg: No edema. "   Lymphadenopathy:      Cervical: No cervical adenopathy.   Skin:     General: Skin is warm and dry.      Findings: No rash.   Neurological:      General: No focal deficit present.      Mental Status: She is alert and oriented to person, place, and time. Mental status is at baseline.      Gait: Gait normal.   Psychiatric:         Mood and Affect: Mood normal.         Behavior: Behavior normal.         Thought Content: Thought content normal.         Judgment: Judgment normal.          Results  Results for orders placed or performed during the hospital encounter of 11/03/20   Group B Streptococcus Culture - Swab, Vaginal/Rectum    Specimen: Vaginal/Rectum; Swab   Result Value Ref Range    External Strep Group B Ag Positive    CBC (No Diff)    Specimen: Blood   Result Value Ref Range    WBC 10.36 3.40 - 10.80 10*3/mm3    RBC 3.42 (L) 3.77 - 5.28 10*6/mm3    Hemoglobin 10.7 (L) 12.0 - 15.9 g/dL    Hematocrit 32.3 (L) 34.0 - 46.6 %    MCV 94.4 79.0 - 97.0 fL    MCH 31.3 26.6 - 33.0 pg    MCHC 33.1 31.5 - 35.7 g/dL    RDW 13.2 12.3 - 15.4 %    RDW-SD 45.4 37.0 - 54.0 fl    MPV 10.2 6.0 - 12.0 fL    Platelets 219 140 - 450 10*3/mm3   CBC Auto Differential    Specimen: Blood   Result Value Ref Range    WBC 10.97 (H) 3.40 - 10.80 10*3/mm3    RBC 3.18 (L) 3.77 - 5.28 10*6/mm3    Hemoglobin 10.1 (L) 12.0 - 15.9 g/dL    Hematocrit 30.8 (L) 34.0 - 46.6 %    MCV 96.9 79.0 - 97.0 fL    MCH 31.8 26.6 - 33.0 pg    MCHC 32.8 31.5 - 35.7 g/dL    RDW 13.2 12.3 - 15.4 %    RDW-SD 46.6 37.0 - 54.0 fl    MPV 10.2 6.0 - 12.0 fL    Platelets 213 140 - 450 10*3/mm3    Neutrophil % 75.4 42.7 - 76.0 %    Lymphocyte % 12.9 (L) 19.6 - 45.3 %    Monocyte % 8.3 5.0 - 12.0 %    Eosinophil % 1.8 0.3 - 6.2 %    Basophil % 0.6 0.0 - 1.5 %    Immature Grans % 1.0 (H) 0.0 - 0.5 %    Neutrophils, Absolute 8.26 (H) 1.70 - 7.00 10*3/mm3    Lymphocytes, Absolute 1.42 0.70 - 3.10 10*3/mm3    Monocytes, Absolute 0.91 (H) 0.10 - 0.90 10*3/mm3    Eosinophils,  Absolute 0.20 0.00 - 0.40 10*3/mm3    Basophils, Absolute 0.07 0.00 - 0.20 10*3/mm3    Immature Grans, Absolute 0.11 (H) 0.00 - 0.05 10*3/mm3    nRBC 0.0 0.0 - 0.2 /100 WBC   Type & Screen    Specimen: Blood   Result Value Ref Range    ABO Type O     RH type Positive     Antibody Screen Negative     T&S Expiration Date 11/6/2020 11:59:59 PM         Assessment and Plan  Diagnoses and all orders for this visit:    Annual physical exam  - Counseling was given to patient for the following topics:  appropriate exercise, healthy eating habits, disease prevention, importance of immunizations, including risks and benefits and risks of smoking (including electronic cigarettes) including cancer and death. Also discussed the importance of regular dental and vision care, as well recommendation for a yearly screening skin exam after age 40.  Written information provided to patient on these topics and other health maintenance issues.  - Labs ordered  - Tdap and Flu UTD. Defers pneumovax.  - Pap UTD.    Mild intermittent asthma without complication  - On symbicort with rare albuterol use, no exacerbations. Continue current therapy.    Non-seasonal allergic rhinitis, unspecified trigger  - Controlled on zyrtec    Anxiety  - Started on sertraline 50mg daily and anxiety is improving    Screening, lipid  - Lipid panel ordered    Screening for diabetes mellitus  - A1c ordered    Health Maintenance   Topic Date Due   • ANNUAL PHYSICAL  Never done   • Pneumococcal Vaccine 0-64 (1 of 1 - PPSV23) Never done   • PAP SMEAR  09/06/2016   • TDAP/TD VACCINES (2 - Td) 09/14/2030   • HEPATITIS C SCREENING  Completed   • INFLUENZA VACCINE  Completed   • MENINGOCOCCAL VACCINE  Aged Out     Health Maintenance  - Pap smear: pap recently normal at [facility].  - Mammogram: Start screening at age 40.  - Colonoscopy: Start screening at age 45.  - HCV: negative  - Immunizations: Flu UTD, tdap 2020. Defers pneumovax.  - Depression screening: negative  3/2021      Return in about 1 year (around 3/8/2022) for Annual, Labs today.

## 2021-03-12 DIAGNOSIS — R79.89 ELEVATED SERUM CREATININE: Primary | ICD-10-CM

## 2021-03-21 DIAGNOSIS — J45.21 MILD INTERMITTENT ASTHMA WITH ACUTE EXACERBATION: ICD-10-CM

## 2021-03-22 RX ORDER — ALBUTEROL SULFATE 90 UG/1
AEROSOL, METERED RESPIRATORY (INHALATION)
Qty: 18 G | Refills: 4 | Status: SHIPPED | OUTPATIENT
Start: 2021-03-22 | End: 2022-06-03

## 2021-03-22 RX ORDER — BUDESONIDE AND FORMOTEROL FUMARATE DIHYDRATE 160; 4.5 UG/1; UG/1
AEROSOL RESPIRATORY (INHALATION)
Qty: 10.2 G | Refills: 11 | Status: SHIPPED | OUTPATIENT
Start: 2021-03-22 | End: 2022-05-05

## 2021-03-22 NOTE — TELEPHONE ENCOUNTER
Last Office Visit: 3/8/21  Next Office Visit: 3/14/22    Labs completed in past 6 months? yes  Labs completed in past year? yes    Last Refill Date: albuterol 3/16/20, budesonide 3/16/20  Quantity: albuterol 1 inhaler, budesonide 1 inhaler  Refills: albuterol 5,  Budesonide 5    Pharmacy: On File

## 2021-04-07 ENCOUNTER — LAB (OUTPATIENT)
Dept: LAB | Facility: HOSPITAL | Age: 31
End: 2021-04-07

## 2021-04-07 DIAGNOSIS — R79.89 ELEVATED SERUM CREATININE: ICD-10-CM

## 2021-04-07 LAB
ALBUMIN SERPL-MCNC: 4.7 G/DL (ref 3.5–5.2)
ALBUMIN/GLOB SERPL: 1.5 G/DL
ALP SERPL-CCNC: 71 U/L (ref 39–117)
ALT SERPL W P-5'-P-CCNC: 13 U/L (ref 1–33)
ANION GAP SERPL CALCULATED.3IONS-SCNC: 10.1 MMOL/L (ref 5–15)
AST SERPL-CCNC: 18 U/L (ref 1–32)
BILIRUB SERPL-MCNC: 0.3 MG/DL (ref 0–1.2)
BUN SERPL-MCNC: 15 MG/DL (ref 6–20)
BUN/CREAT SERPL: 15.5 (ref 7–25)
CALCIUM SPEC-SCNC: 9.7 MG/DL (ref 8.6–10.5)
CHLORIDE SERPL-SCNC: 103 MMOL/L (ref 98–107)
CO2 SERPL-SCNC: 26.9 MMOL/L (ref 22–29)
CREAT SERPL-MCNC: 0.97 MG/DL (ref 0.57–1)
GFR SERPL CREATININE-BSD FRML MDRD: 68 ML/MIN/1.73
GLOBULIN UR ELPH-MCNC: 3.1 GM/DL
GLUCOSE SERPL-MCNC: 79 MG/DL (ref 65–99)
POTASSIUM SERPL-SCNC: 4.2 MMOL/L (ref 3.5–5.2)
PROT SERPL-MCNC: 7.8 G/DL (ref 6–8.5)
SODIUM SERPL-SCNC: 140 MMOL/L (ref 136–145)

## 2021-04-07 PROCEDURE — 80053 COMPREHEN METABOLIC PANEL: CPT

## 2021-05-06 ENCOUNTER — TELEPHONE (OUTPATIENT)
Dept: INTERNAL MEDICINE | Facility: CLINIC | Age: 31
End: 2021-05-06

## 2021-05-06 NOTE — TELEPHONE ENCOUNTER
PATIENT FELT SHE SHOULD RE-START HER ZOLOFT AND HAS TAKEN IT FOR 2 NIGHTS.  NOW HAVING PANIC ATTACKS AND STOMACH PAINS.   WHAT TO DO?    PLEASE CALL [phone]

## 2021-05-07 ENCOUNTER — OFFICE VISIT (OUTPATIENT)
Dept: INTERNAL MEDICINE | Facility: CLINIC | Age: 31
End: 2021-05-07

## 2021-05-07 VITALS
HEART RATE: 87 BPM | SYSTOLIC BLOOD PRESSURE: 100 MMHG | DIASTOLIC BLOOD PRESSURE: 80 MMHG | TEMPERATURE: 97.3 F | WEIGHT: 112.8 LBS | BODY MASS INDEX: 20.63 KG/M2 | OXYGEN SATURATION: 93 %

## 2021-05-07 DIAGNOSIS — F41.9 ANXIETY: Primary | ICD-10-CM

## 2021-05-07 PROCEDURE — 99213 OFFICE O/P EST LOW 20 MIN: CPT | Performed by: PHYSICIAN ASSISTANT

## 2021-05-07 RX ORDER — FLUOXETINE 10 MG/1
10 TABLET, FILM COATED ORAL DAILY
Qty: 30 TABLET | Refills: 2 | Status: SHIPPED | OUTPATIENT
Start: 2021-05-07 | End: 2021-06-07 | Stop reason: SDUPTHER

## 2021-05-07 RX ORDER — HYDROXYZINE HYDROCHLORIDE 25 MG/1
25 TABLET, FILM COATED ORAL 3 TIMES DAILY PRN
Qty: 30 TABLET | Refills: 0 | Status: SHIPPED | OUTPATIENT
Start: 2021-05-07 | End: 2021-06-07 | Stop reason: SINTOL

## 2021-05-07 NOTE — TELEPHONE ENCOUNTER
"\"HUB TO READ\"  Please schedule when pt returns call     Message left for pt to call office to schedule an apt   "

## 2021-05-07 NOTE — TELEPHONE ENCOUNTER
PATIENT CALLED IN AFTER RECEIVING A CALL FROM THE OFFICE. I RELAYED THE HUB TO READ MESSAGE. THE PATIENT SCHEDULED AN APPOINTMENT FOR 05/07/2021 AT 10:45 WITH JULIAN TAPIA

## 2021-05-11 NOTE — ASSESSMENT & PLAN NOTE
Discussed treatment options and decided to try another SSRI, fluoxetine. Start with 10 mg due to concern for SE. Use hydroxyzine prn at onset of panic attack or at bedtime to help with sleep. Refer to [facility].

## 2021-06-07 ENCOUNTER — OFFICE VISIT (OUTPATIENT)
Dept: INTERNAL MEDICINE | Facility: CLINIC | Age: 31
End: 2021-06-07

## 2021-06-07 VITALS
SYSTOLIC BLOOD PRESSURE: 116 MMHG | HEIGHT: 62 IN | DIASTOLIC BLOOD PRESSURE: 64 MMHG | BODY MASS INDEX: 21.53 KG/M2 | OXYGEN SATURATION: 99 % | WEIGHT: 117 LBS | HEART RATE: 77 BPM | RESPIRATION RATE: 16 BRPM | TEMPERATURE: 97.7 F

## 2021-06-07 DIAGNOSIS — F41.9 ANXIETY: ICD-10-CM

## 2021-06-07 PROCEDURE — 99213 OFFICE O/P EST LOW 20 MIN: CPT | Performed by: INTERNAL MEDICINE

## 2021-06-07 RX ORDER — FLUOXETINE 20 MG/1
20 TABLET, FILM COATED ORAL DAILY
Qty: 30 TABLET | Refills: 2 | Status: SHIPPED | OUTPATIENT
Start: 2021-06-07 | End: 2021-08-20

## 2021-06-07 NOTE — PROGRESS NOTES
"Internal Medicine Follow Up    Chief Complaint  Lillie Dos Santos is a 30 y.o. female who presents today for follow up of chronic medical conditions outlined below.    Chief Complaint   Patient presents with   • Follow-up     4 week, Prozac dosage        HPI  Ms. Dos Santos comes in today for follow up of anxiety with panic attacks. She was seen a month ago in the office after developing increased anxiety. She had at that time recently restarted her zoloft and developed GI side effects as well as panic attacks. She was started on prozac 10mg daily which she has tolerated. She continues to feel that she is on the \"brink\" of anxiety and panic attacks. She notes that she has had difficulty identifying a trigger for her anxiety but excessive heat seems to bring on panic attacks. She is not sleeping well. She could not tolerate hydroxyzine due to dizziness and feeling \"drunk.\" She is willing to try a higher dose of prozac.  She also plans to schedule with behavioral health. She was referred last visit but could not schedule until she activated her BrightSunhart which she did last week.       Review of Systems  Review of Systems   Constitutional: Positive for fatigue.   Neurological: Positive for dizziness (with hydroxyzine).   Psychiatric/Behavioral: Positive for sleep disturbance. Negative for depressed mood. The patient is nervous/anxious.         Current Medications  Current Outpatient Medications on File Prior to Visit   Medication Sig Dispense Refill   • albuterol sulfate  (90 Base) MCG/ACT inhaler INHALE TWO PUFFS BY MOUTH EVERY 6 HOURS AS NEEDED FOR WHEEZING OR SHORTNESSS OF AIR 18 g 4   • budesonide-formoterol (SYMBICORT) 160-4.5 MCG/ACT inhaler INHALE TWO PUFFS BY MOUTH TWICE A DAY 10.2 g 11   • Cetirizine HCl (ZYRTEC ALLERGY) 10 MG capsule Take  by mouth.     • [DISCONTINUED] FLUoxetine (PROzac) 10 MG tablet Take 1 tablet by mouth Daily for 90 days. 30 tablet 2   • [DISCONTINUED] hydrOXYzine (ATARAX) 25 MG tablet Take 1 " "tablet by mouth 3 (Three) Times a Day As Needed for Anxiety. 30 tablet 0     No current facility-administered medications on file prior to visit.       Allergies  Allergies   Allergen Reactions   • Amoxicillin GI Intolerance   • Penicillins GI Intolerance       Objective  Visit Vitals  /64   Pulse 77   Temp 97.7 °F (36.5 °C)   Resp 16   Ht 157.5 cm (62.01\")   Wt 53.1 kg (117 lb)   SpO2 99%   BMI 21.39 kg/m²        Physical Exam  Physical Exam  Vitals and nursing note reviewed.   Constitutional:       General: She is not in acute distress.     Appearance: Normal appearance. She is well-developed and normal weight. She is not ill-appearing.   HENT:      Head: Normocephalic and atraumatic.   Eyes:      Conjunctiva/sclera: Conjunctivae normal.   Pulmonary:      Effort: Pulmonary effort is normal. No respiratory distress.   Skin:     General: Skin is warm and dry.   Neurological:      Mental Status: She is alert and oriented to person, place, and time. Mental status is at baseline.   Psychiatric:         Mood and Affect: Mood normal.         Behavior: Behavior normal.         Thought Content: Thought content normal.         Judgment: Judgment normal.         Results  Results for orders placed or performed in visit on 04/07/21   Comprehensive Metabolic Panel    Specimen: Blood   Result Value Ref Range    Glucose 79 65 - 99 mg/dL    BUN 15 6 - 20 mg/dL    Creatinine 0.97 0.57 - 1.00 mg/dL    Sodium 140 136 - 145 mmol/L    Potassium 4.2 3.5 - 5.2 mmol/L    Chloride 103 98 - 107 mmol/L    CO2 26.9 22.0 - 29.0 mmol/L    Calcium 9.7 8.6 - 10.5 mg/dL    Total Protein 7.8 6.0 - 8.5 g/dL    Albumin 4.70 3.50 - 5.20 g/dL    ALT (SGPT) 13 1 - 33 U/L    AST (SGOT) 18 1 - 32 U/L    Alkaline Phosphatase 71 39 - 117 U/L    Total Bilirubin 0.3 0.0 - 1.2 mg/dL    eGFR Non African Amer 68 >60 mL/min/1.73    Globulin 3.1 gm/dL    A/G Ratio 1.5 g/dL    BUN/Creatinine Ratio 15.5 7.0 - 25.0    Anion Gap 10.1 5.0 - 15.0 mmol/L    "     Assessment and Plan  Diagnoses and all orders for this visit:    Anxiety  - Improved but remains uncontrolled. Still not sleeping well and on edge.  - Will increase prozac to 20mg daily as she has tolerated 10mg well. Unfortunately did not tolerate hydroxyzine.  - Recommend she schedule with behavioral health. Also discussed grounding techniques briefly and she will look into these further.  - Follow up in 6 weeks     Health Maintenance  - Pap smear: pap recently normal at [facility].  - Mammogram: Start screening at age 40.  - Colonoscopy: Start screening at age 45.  - HCV: negative  - Immunizations: tdap 2020. Defers pneumovax.  - Depression screening: negative 3/2021    Return in about 6 weeks (around 7/19/2021) for Follow up anxiety.

## 2021-06-29 ENCOUNTER — TELEPHONE (OUTPATIENT)
Dept: INTERNAL MEDICINE | Facility: CLINIC | Age: 31
End: 2021-06-29

## 2021-07-01 NOTE — TELEPHONE ENCOUNTER
These are all potential but uncommon side effects. Would she like to reduce dose or switch medications?

## 2021-07-02 NOTE — TELEPHONE ENCOUNTER
Caller: Lillie Dos Santos    Relationship: Self    Best call back number: [phone]    What is the best time to reach you: AFTER 9:30    Who are you requesting to speak with (clinical staff, provider,  specific staff member): CLINICAL STAFF    Do you know the name of the person who called: QUIN     What was the call regarding: LAB RESULTS     Do you require a callback: YES

## 2021-07-03 NOTE — TELEPHONE ENCOUNTER
"Spoke w/ pt, states that the increase in med has been only change, states she still hasn't started cycle yet, took home pregnancy test and was negative. Seems to be more \"spaced out\", is still breast feeding, and appetite has increased, like she isn't feeling full like before. Suggested appt, which she has scheduled end of month due to availability on both sides. Pt can only do Monday's as well.  "

## 2021-07-05 NOTE — TELEPHONE ENCOUNTER
Please advise patient to cut prozac tablets in half and take 10mg daily until appointment to see if symptoms subside.

## 2021-07-06 NOTE — TELEPHONE ENCOUNTER
LVM for Pt to return call about cutting prozac in half and take 10 mg daily until visit on 7/29. Office number given    normal...

## 2021-07-26 ENCOUNTER — OFFICE VISIT (OUTPATIENT)
Dept: INTERNAL MEDICINE | Facility: CLINIC | Age: 31
End: 2021-07-26

## 2021-07-26 ENCOUNTER — LAB (OUTPATIENT)
Dept: LAB | Facility: HOSPITAL | Age: 31
End: 2021-07-26

## 2021-07-26 VITALS
DIASTOLIC BLOOD PRESSURE: 82 MMHG | HEART RATE: 60 BPM | BODY MASS INDEX: 24 KG/M2 | HEIGHT: 62 IN | OXYGEN SATURATION: 99 % | TEMPERATURE: 97.6 F | SYSTOLIC BLOOD PRESSURE: 118 MMHG | WEIGHT: 130.4 LBS

## 2021-07-26 DIAGNOSIS — R63.5 WEIGHT GAIN: Primary | ICD-10-CM

## 2021-07-26 DIAGNOSIS — R63.5 WEIGHT GAIN: ICD-10-CM

## 2021-07-26 DIAGNOSIS — N92.6 IRREGULAR MENSTRUATION: ICD-10-CM

## 2021-07-26 DIAGNOSIS — F41.9 ANXIETY: ICD-10-CM

## 2021-07-26 LAB
HCG SERPL QL: NEGATIVE
TSH SERPL DL<=0.05 MIU/L-ACNC: 1.28 UIU/ML (ref 0.27–4.2)

## 2021-07-26 PROCEDURE — 84703 CHORIONIC GONADOTROPIN ASSAY: CPT

## 2021-07-26 PROCEDURE — 99214 OFFICE O/P EST MOD 30 MIN: CPT | Performed by: INTERNAL MEDICINE

## 2021-07-26 PROCEDURE — 84443 ASSAY THYROID STIM HORMONE: CPT

## 2021-07-26 NOTE — PROGRESS NOTES
Internal Medicine Follow Up    Chief Complaint  Lillie Dos Santos is a 30 y.o. female who presents today for follow up of chronic medical conditions outlined below.    Chief Complaint   Patient presents with   • Anxiety     follow up        HPI  Ms. Dos Santos comes in today to follow up on anxiety. Had increased prozac to 20mg daily at last visit about 6 weeks ago. She subsequently notes weight gain and menstrual irregularity so weaned back to 10mg daily. She has gained 23lbs since last visit. She notes period was 5 days late and shorter than typical. She then has noted some spotting. She took several home pregnancy tests which were negative. She has felt well in regards to anxiety and is going to wean off prozac. She plans to quit breastfeeding soon and may want to try an alternative then but defers today.       Review of Systems  Review of Systems   Constitutional: Positive for unexpected weight gain. Negative for activity change and appetite change.   Respiratory: Negative.    Cardiovascular: Negative.    Genitourinary: Positive for menstrual problem.   Psychiatric/Behavioral: Negative.         Current Medications  Current Outpatient Medications on File Prior to Visit   Medication Sig Dispense Refill   • albuterol sulfate  (90 Base) MCG/ACT inhaler INHALE TWO PUFFS BY MOUTH EVERY 6 HOURS AS NEEDED FOR WHEEZING OR SHORTNESSS OF AIR 18 g 4   • budesonide-formoterol (SYMBICORT) 160-4.5 MCG/ACT inhaler INHALE TWO PUFFS BY MOUTH TWICE A DAY 10.2 g 11   • Cetirizine HCl (ZYRTEC ALLERGY) 10 MG capsule Take  by mouth.     • FLUoxetine (PROzac) 20 MG tablet Take 1 tablet by mouth Daily. (Patient taking differently: Take 10 mg by mouth Daily.) 30 tablet 2     No current facility-administered medications on file prior to visit.       Allergies  Allergies   Allergen Reactions   • Amoxicillin GI Intolerance   • Penicillins GI Intolerance       Objective  Visit Vitals  /82   Pulse 60   Temp 97.6 °F (36.4 °C)   Ht 157.5 cm  "(62\")   Wt 59.1 kg (130 lb 6.4 oz)   SpO2 99%   Breastfeeding Yes   BMI 23.85 kg/m²        Physical Exam  Physical Exam  Vitals and nursing note reviewed.   Constitutional:       General: She is not in acute distress.     Appearance: She is well-developed. She is not ill-appearing or toxic-appearing.   HENT:      Head: Normocephalic and atraumatic.   Eyes:      Conjunctiva/sclera: Conjunctivae normal.   Cardiovascular:      Rate and Rhythm: Normal rate and regular rhythm.      Heart sounds: Normal heart sounds.   Pulmonary:      Effort: Pulmonary effort is normal. No respiratory distress.      Breath sounds: Normal breath sounds.   Musculoskeletal:      Right lower leg: No edema.      Left lower leg: No edema.   Skin:     General: Skin is warm and dry.   Neurological:      Mental Status: She is alert and oriented to person, place, and time. Mental status is at baseline.   Psychiatric:         Mood and Affect: Mood normal.         Behavior: Behavior normal.         Thought Content: Thought content normal.         Judgment: Judgment normal.         Results  Results for orders placed or performed in visit on 04/07/21   Comprehensive Metabolic Panel    Specimen: Blood   Result Value Ref Range    Glucose 79 65 - 99 mg/dL    BUN 15 6 - 20 mg/dL    Creatinine 0.97 0.57 - 1.00 mg/dL    Sodium 140 136 - 145 mmol/L    Potassium 4.2 3.5 - 5.2 mmol/L    Chloride 103 98 - 107 mmol/L    CO2 26.9 22.0 - 29.0 mmol/L    Calcium 9.7 8.6 - 10.5 mg/dL    Total Protein 7.8 6.0 - 8.5 g/dL    Albumin 4.70 3.50 - 5.20 g/dL    ALT (SGPT) 13 1 - 33 U/L    AST (SGOT) 18 1 - 32 U/L    Alkaline Phosphatase 71 39 - 117 U/L    Total Bilirubin 0.3 0.0 - 1.2 mg/dL    eGFR Non African Amer 68 >60 mL/min/1.73    Globulin 3.1 gm/dL    A/G Ratio 1.5 g/dL    BUN/Creatinine Ratio 15.5 7.0 - 25.0    Anion Gap 10.1 5.0 - 15.0 mmol/L        Assessment and Plan  Diagnoses and all orders for this visit:    Weight gain  - Possible side effect of prozac, " weaning off. Given associated irregular menstruation also checking TSH.    Irregular menstruation  - I am not certain that this can be attributed to prozac. Possibly thyroid dysfunction given associated weight gain. Will check TSH.  - Also checking serum hcg however several home tests have been negative and she has since menstruated.  - If no etiology identified she will follow up with her GYN    Anxiety  - Currently notes stable mood and desire to wean off prozac due to above. Advised to use prozac 10mg every other day for a week before stopping.  - She defers initiation of new medication as she is still currently breastfeeding but plans to stop soon and can consider alternatives then. Buspar is a possibility.     Health Maintenance  - Pap smear: pap recently normal at [facility].  - Mammogram: Start screening at age 42.  - Colonoscopy: Start screening at age 47.  - HCV: negative  - Immunizations: tdap 2020. Defers pneumovax.  - Depression screening: negative 3/2021    Return in about 2 months (around 9/26/2021) for Follow up anxiety, weight gain.

## 2021-08-20 ENCOUNTER — TELEPHONE (OUTPATIENT)
Dept: INTERNAL MEDICINE | Facility: CLINIC | Age: 31
End: 2021-08-20

## 2021-08-20 DIAGNOSIS — F41.9 ANXIETY AND DEPRESSION: Primary | ICD-10-CM

## 2021-08-20 DIAGNOSIS — F32.A ANXIETY AND DEPRESSION: Primary | ICD-10-CM

## 2021-08-20 RX ORDER — VENLAFAXINE HYDROCHLORIDE 37.5 MG/1
37.5 CAPSULE, EXTENDED RELEASE ORAL DAILY
Qty: 30 CAPSULE | Refills: 1 | Status: SHIPPED | OUTPATIENT
Start: 2021-08-20 | End: 2022-01-17

## 2021-08-20 NOTE — TELEPHONE ENCOUNTER
Caller: Lillie Dos Santos    Relationship: Self    Best call back number: [phone]    What medication are you requesting: ANXIETY MEDICATION, THAT DOESN'T CAUSE WEIGHT GAIN    What are your current symptoms: SHE IS HAVING DEPRESSIVE THOUGHTS HAVING TO TALK HERSELF OUT OF PANIC ATTACKS    How long have you been experiencing symptoms: THE PAST 3 OR 4 DAYS    Have you had these symptoms before:    [x] Yes  [] No    Have you been treated for these symptoms before:   [x] Yes  [] No    If a prescription is needed, what is your preferred pharmacy and phone number: [facility] [place] [address] [facility] [phone] [facility] [phone]      Additional notes: THE PATIENT HAS FINISHED BREAST FEEDING AND WOULD LIKE DR DE LA CRUZ TO SEND IN A MEDICATION TO HELP WITH THESE CURRENT ANXIETY AND DEPRESSION SYMPTOMS. SHE HAS AN APPOINTMENT WITH THE [facility] ON AUGUST 3OTH. PLEASE CALL AND ADVISE

## 2021-08-20 NOTE — TELEPHONE ENCOUNTER
Please let patient know that effexor 37.5mg daily was sent to her pharmacy. Will take several weeks to see maximum effect. She needs follow up with me in 4 weeks, sooner if mood worsening.

## 2022-01-17 ENCOUNTER — TELEPHONE (OUTPATIENT)
Dept: INTERNAL MEDICINE | Facility: CLINIC | Age: 32
End: 2022-01-17

## 2022-01-17 DIAGNOSIS — F41.9 ANXIETY: Primary | ICD-10-CM

## 2022-01-17 RX ORDER — HYDROXYZINE HYDROCHLORIDE 25 MG/1
25 TABLET, FILM COATED ORAL 3 TIMES DAILY PRN
Qty: 90 TABLET | Refills: 1 | Status: SHIPPED | OUTPATIENT
Start: 2022-01-17

## 2022-01-17 NOTE — TELEPHONE ENCOUNTER
Spoke to Pt and she stated that she wants to start taking Hydroxyzine again instead of the Effexor. Hydroxyzine helps her better. Please advise

## 2022-01-17 NOTE — TELEPHONE ENCOUNTER
LVM for Pt to return call to [facility] Hydroxyzine refill request. Office number given

## 2022-01-17 NOTE — TELEPHONE ENCOUNTER
Caller: Lillie Dos aSntos    Relationship: Self    Best call back number: [phone]    What medication are you requesting: HYDROXYZINE  25 MG TABLET    What are your current symptoms: ANXIETY    If a prescription is needed, what is your preferred pharmacy and phone number: [facility] [address] [place], [place] [address] [facility] [facility] [phone] [facility] [phone] FX     Additional notes:

## 2022-01-19 ENCOUNTER — TELEPHONE (OUTPATIENT)
Dept: INTERNAL MEDICINE | Facility: CLINIC | Age: 32
End: 2022-01-19

## 2022-01-19 NOTE — TELEPHONE ENCOUNTER
Caller: Lillie Dos Santos    Relationship: Self    Best call back number: [phone]    Pharmacy where request should be sent:  [facility] [place]  Additional details provided by patient: PATIENT STATES SHE IS WAITING FOR A REPLY TO SEE IF HER MEDICATIONS ARE AVAILABLE TO -STATES SHE ALWAYS GETS A TEXT SAYING THAT THE PRESCRIPTIONS HAVE BEEN CALLED IN BUT HAS NOT RECEIVED ANY NOTICE YET- PATIENT WAS INSTRUCTED TO CALL PHARMACY TO CHECK IN ADDITION TO THIS MESSAGE BEING SENT.      Maryanne Crews Rep   01/19/22 12:26 EST

## 2022-05-05 DIAGNOSIS — J45.21 MILD INTERMITTENT ASTHMA WITH ACUTE EXACERBATION: ICD-10-CM

## 2022-05-05 RX ORDER — BUDESONIDE AND FORMOTEROL FUMARATE DIHYDRATE 160; 4.5 UG/1; UG/1
AEROSOL RESPIRATORY (INHALATION)
Qty: 10.2 G | Refills: 0 | Status: SHIPPED | OUTPATIENT
Start: 2022-05-05 | End: 2022-06-03

## 2022-05-05 NOTE — TELEPHONE ENCOUNTER
Caller: Lillie Dos Santos    Relationship: Self    Best call back number: [phone]    Requested Prescriptions:   Requested Prescriptions     Pending Prescriptions Disp Refills   • Symbicort 160-4.5 MCG/ACT inhaler [Pharmacy Med Name: SYMBICORT 160-4.5 MCG INHALER] 10.2 g 11     Sig: INHALE TWO PUFFS BY MOUTH TWICE A DAY        [facility] be sent[facility] [place] [address] [facility] [phone] [facility] [phone]      Additional details provided by patient: PRESCRIPTION HAS  AND OUT OF MEDICATION.     Does the patient have less than a 3 day supply:  [x] Yes  [] No    Maryanne Sykes Rep   22 15:36 EDT

## 2022-05-05 NOTE — TELEPHONE ENCOUNTER
LOV 7/26/21 order to return in 2 months for anxiety and weight gain.  Pt cancelled 9/20/21, 10/4/21 and 3/14/22  No future visit on file at this time  Last filled 3/22/21 #10.2g and 11 refills

## 2022-06-02 DIAGNOSIS — J45.21 MILD INTERMITTENT ASTHMA WITH ACUTE EXACERBATION: ICD-10-CM

## 2022-06-03 RX ORDER — BUDESONIDE AND FORMOTEROL FUMARATE DIHYDRATE 160; 4.5 UG/1; UG/1
AEROSOL RESPIRATORY (INHALATION)
Qty: 10.2 G | Refills: 0 | Status: SHIPPED | OUTPATIENT
Start: 2022-06-03 | End: 2022-11-10 | Stop reason: SDUPTHER

## 2022-06-03 NOTE — TELEPHONE ENCOUNTER
Last Office Visit: 7/26/21  Next Office Visit: None scheduled    Labs completed in past 6 months? no  Labs completed in past year? yes    Last Refill Date: Symbicort 5/5/22       ProAir HFA 3/22/21  Quantity: Symbicort 10.2 g       ProAir HFA 10.2 g  Refills: Symbicort 0   ProAir HFA 0    [place] [facility] [place] [address] [facility] - [phone] [facility] [phone] FX

## 2022-06-03 NOTE — TELEPHONE ENCOUNTER
Caller: Lillie Dos Santos    Relationship: Self    Best call back number: [phone]    Requested Prescriptions:   Requested Prescriptions     Pending Prescriptions Disp Refills   • Symbicort 160-4.5 MCG/ACT inhaler [Pharmacy Med Name: SYMBICORT 160-4.5 MCG INHALER] 10.2 g 0     Sig: INHALE TWO PUFFS BY MOUTH TWICE A DAY MUST HAVE APPOINTMENT   • ProAir  (90 Base) MCG/ACT inhaler [Pharmacy Med Name: PROAIR HFA 90 MCG INHALER] 8.5 g      Sig: INHALE TWO PUFFS BY MOUTH EVERY 6 HOURS AS NEEDED FOR WHEEZING AND SHORTNESS OF AIR        Pharmacy where request should be sent: [facility] [address] [place] [address] [facility] [phone] [facility] [phone]      Additional details provided by patient: CHECKING STATUS OF REFILL. PHARMACY STATES DOES NOT HAVE REFILLS  Does the patient have less than a 3 day supply:  [x] Yes  [] No    Maryanne Crews Rep   06/03/22 08:37 EDT

## 2022-10-29 DIAGNOSIS — J45.21 MILD INTERMITTENT ASTHMA WITH ACUTE EXACERBATION: ICD-10-CM

## 2022-10-31 RX ORDER — BUDESONIDE AND FORMOTEROL FUMARATE DIHYDRATE 160; 4.5 UG/1; UG/1
AEROSOL RESPIRATORY (INHALATION)
Qty: 10.2 G | Refills: 0 | OUTPATIENT
Start: 2022-10-31

## 2022-11-04 ENCOUNTER — TELEPHONE (OUTPATIENT)
Dept: INTERNAL MEDICINE | Facility: CLINIC | Age: 32
End: 2022-11-04

## 2022-11-04 NOTE — TELEPHONE ENCOUNTER
PT called to schedule an appointment& Hub transferred to office; pt stated she feels cold, clammy & like she is going to pass out; declined to go to ED.  Pt is scheduled w/Cece @ 11:00 AM

## 2022-11-10 ENCOUNTER — LAB (OUTPATIENT)
Dept: LAB | Facility: HOSPITAL | Age: 32
End: 2022-11-10

## 2022-11-10 ENCOUNTER — OFFICE VISIT (OUTPATIENT)
Dept: INTERNAL MEDICINE | Facility: CLINIC | Age: 32
End: 2022-11-10

## 2022-11-10 VITALS
HEIGHT: 62 IN | HEART RATE: 104 BPM | SYSTOLIC BLOOD PRESSURE: 108 MMHG | DIASTOLIC BLOOD PRESSURE: 60 MMHG | BODY MASS INDEX: 22.08 KG/M2 | WEIGHT: 120 LBS | OXYGEN SATURATION: 98 %

## 2022-11-10 DIAGNOSIS — R53.83 FEELING TIRED: ICD-10-CM

## 2022-11-10 DIAGNOSIS — J45.20 MILD INTERMITTENT ASTHMA WITHOUT COMPLICATION: Primary | ICD-10-CM

## 2022-11-10 DIAGNOSIS — R25.1 SHAKY: ICD-10-CM

## 2022-11-10 DIAGNOSIS — Z13.220 SCREENING FOR LIPID DISORDERS: ICD-10-CM

## 2022-11-10 DIAGNOSIS — Z13.21 ENCOUNTER FOR VITAMIN DEFICIENCY SCREENING: ICD-10-CM

## 2022-11-10 DIAGNOSIS — Z13.1 SCREENING FOR DIABETES MELLITUS: ICD-10-CM

## 2022-11-10 DIAGNOSIS — R42 DIZZINESS: ICD-10-CM

## 2022-11-10 DIAGNOSIS — Z76.0 ENCOUNTER FOR MEDICATION REFILL: ICD-10-CM

## 2022-11-10 LAB
DEPRECATED RDW RBC AUTO: 40.4 FL (ref 37–54)
ERYTHROCYTE [DISTWIDTH] IN BLOOD BY AUTOMATED COUNT: 12.3 % (ref 12.3–15.4)
HBA1C MFR BLD: 5.4 % (ref 4.8–5.6)
HCT VFR BLD AUTO: 40.9 % (ref 34–46.6)
HGB BLD-MCNC: 13.4 G/DL (ref 12–15.9)
MCH RBC QN AUTO: 29.6 PG (ref 26.6–33)
MCHC RBC AUTO-ENTMCNC: 32.8 G/DL (ref 31.5–35.7)
MCV RBC AUTO: 90.3 FL (ref 79–97)
PLATELET # BLD AUTO: 327 10*3/MM3 (ref 140–450)
PMV BLD AUTO: 10.1 FL (ref 6–12)
RBC # BLD AUTO: 4.53 10*6/MM3 (ref 3.77–5.28)
WBC NRBC COR # BLD: 6.62 10*3/MM3 (ref 3.4–10.8)

## 2022-11-10 PROCEDURE — 80061 LIPID PANEL: CPT | Performed by: NURSE PRACTITIONER

## 2022-11-10 PROCEDURE — 82746 ASSAY OF FOLIC ACID SERUM: CPT | Performed by: NURSE PRACTITIONER

## 2022-11-10 PROCEDURE — 83036 HEMOGLOBIN GLYCOSYLATED A1C: CPT | Performed by: NURSE PRACTITIONER

## 2022-11-10 PROCEDURE — 82607 VITAMIN B-12: CPT | Performed by: NURSE PRACTITIONER

## 2022-11-10 PROCEDURE — 99214 OFFICE O/P EST MOD 30 MIN: CPT | Performed by: NURSE PRACTITIONER

## 2022-11-10 PROCEDURE — 83540 ASSAY OF IRON: CPT | Performed by: NURSE PRACTITIONER

## 2022-11-10 PROCEDURE — 82306 VITAMIN D 25 HYDROXY: CPT | Performed by: NURSE PRACTITIONER

## 2022-11-10 PROCEDURE — 80050 GENERAL HEALTH PANEL: CPT | Performed by: NURSE PRACTITIONER

## 2022-11-10 RX ORDER — PROMETHAZINE HYDROCHLORIDE 25 MG/1
TABLET ORAL
COMMUNITY
Start: 2022-09-13

## 2022-11-10 RX ORDER — ALBUTEROL SULFATE 90 UG/1
2 AEROSOL, METERED RESPIRATORY (INHALATION) EVERY 4 HOURS PRN
Qty: 8.5 G | Refills: 5 | Status: SHIPPED | OUTPATIENT
Start: 2022-11-10 | End: 2022-12-10

## 2022-11-10 RX ORDER — ONDANSETRON 4 MG/1
TABLET, ORALLY DISINTEGRATING ORAL
COMMUNITY
Start: 2022-11-07

## 2022-11-10 RX ORDER — BUDESONIDE AND FORMOTEROL FUMARATE DIHYDRATE 160; 4.5 UG/1; UG/1
2 AEROSOL RESPIRATORY (INHALATION)
Qty: 10.2 G | Refills: 5 | Status: SHIPPED | OUTPATIENT
Start: 2022-11-10 | End: 2023-03-24

## 2022-11-10 NOTE — PROGRESS NOTES
Office Note     Name: Lillie Dos Santos    : 1990     MRN: [id number]     Chief Complaint  Med Refill (Inhalers ) and Dizziness (Random dizzy spells )    Subjective     History of Present Illness:  Lillie Dos Santos is a 32 y.o. female who presents today for medication refills and acute complaint.    Asthma: Patient states that she was diagnosed with asthma at age 11-12.  She was started on the Symbicort inhaler and states that this has provided significant relief of symptoms.  When she is on her Symbicort she only has to use her short acting/emergency inhaler maybe once weekly.  Again when she is on Symbicort, she does not have any coughing or wheezing fits.  No nighttime awakenings.  Last asthma exacerbation was January or 2020.  She has been without her Symbicort over the recent past and has reported an increase in symptoms    Patient also has an acute complaint of dizziness.  She states that she has had this before and knows that it was due to not eating well or eating often.  She has been working on a diet change to eat more frequently with nutritious foods.  She is working to stay well-hydrated.  She has not had any syncopal episodes but will get to a point where she feels like she may pass out.  She will also feels shaky.  She does have occasional sensations of a numbness in her nose.  She has had an increase in stress due to a relationship change and job change at home.  She does have 2 small children.  She denies any particular trigger.  Sometimes the dizzy episodes will happen early in the morning or when she is driving.  Sometimes they last while sometimes they will go away quickly with relaxation interventions.  She does currently use the hydroxyzine but has not over the recent past due to these life stressors.  She states that she does try to eat healthy well-rounded meals.  She does have frequent healthy snacks during the day.  She has been increasing her water intake as well.  Related to  the increased stress at home she also reports that she does occasionally feel more tired    Review of Systems   Constitutional: Negative for chills, fatigue and fever.        Medication refills   HENT: Negative for sore throat.    Eyes: Negative for visual disturbance.   Respiratory: Negative for cough and shortness of breath.    Cardiovascular: Negative for chest pain.   Gastrointestinal: Negative for abdominal pain.   Skin: Negative for color change.   Allergic/Immunologic: Negative for immunocompromised state.   Neurological: Positive for dizziness. Negative for headaches.   Psychiatric/Behavioral: Negative for behavioral problems.       Past Medical History:   Diagnosis Date   • Abnormal Pap smear of cervix    • Asthma    • Depression 8/24/2016     start prozac 10 mg po qd   • Esophagitis, reflux    • Headache    • HSV-2 infection    • Hypoglycemia    • Proteinuria 8/25/2016   • Seizures (HCC)     had 1 or 2 -in teens, unsure of etiology    • UTI (urinary tract infection)        Past Surgical History:   Procedure Laterality Date   • COLPOSCOPY      after first pregnancy    • DENTAL PROCEDURE     • FINGER SURGERY Right     forefinger   • VAGINAL DELIVERY     • WISDOM TOOTH EXTRACTION         Social History     Socioeconomic History   • Marital status:      Spouse name: Raffaele Spears    • Number of children: 1   • Highest education level: Associate degree: occupational, technical, or vocational program   Tobacco Use   • Smoking status: Former     Years: 2.00     Types: Cigarettes     Quit date: 1/1/2012     Years since quitting: 10.8   • Smokeless tobacco: Never   • Tobacco comments:     1 pack every 2 weeks for 2 years during highschool   Vaping Use   • Vaping Use: Never used   Substance and Sexual Activity   • Alcohol use: Not Currently   • Drug use: No   • Sexual activity: Yes     Partners: Male     Birth control/protection: None         Current Outpatient Medications:   •  albuterol sulfate HFA (ProAir  "HFA) 108 (90 Base) MCG/ACT inhaler, Inhale 2 puffs Every 4 (Four) Hours As Needed for Wheezing for up to 30 days., Disp: 8.5 g, Rfl: 5  •  budesonide-formoterol (Symbicort) 160-4.5 MCG/ACT inhaler, Inhale 2 puffs 2 (Two) Times a Day for 30 days., Disp: 10.2 g, Rfl: 5  •  Cetirizine HCl 10 MG capsule, Take  by mouth., Disp: , Rfl:   •  hydrOXYzine (ATARAX) 25 MG tablet, Take 1 tablet by mouth 3 (Three) Times a Day As Needed for Anxiety., Disp: 90 tablet, Rfl: 1  •  ondansetron ODT (ZOFRAN-ODT) 4 MG disintegrating tablet, , Disp: , Rfl:   •  promethazine (PHENERGAN) 25 MG tablet, , Disp: , Rfl:     Objective     Vital Signs  /60   Pulse 104   Ht 157.5 cm (62\")   Wt 54.4 kg (120 lb)   SpO2 98%   BMI 21.95 kg/m²   Estimated body mass index is 21.95 kg/m² as calculated from the following:    Height as of this encounter: 157.5 cm (62\").    Weight as of this encounter: 54.4 kg (120 lb).    BMI is within normal parameters. No other follow-up for BMI required.           Physical Exam  Vitals and nursing note reviewed.   Constitutional:       Appearance: Normal appearance.   HENT:      Head: Normocephalic and atraumatic.      Right Ear: Tympanic membrane, ear canal and external ear normal.      Left Ear: Tympanic membrane, ear canal and external ear normal.   Eyes:      Extraocular Movements: Extraocular movements intact.      Pupils: Pupils are equal, round, and reactive to light.   Cardiovascular:      Rate and Rhythm: Normal rate and regular rhythm.      Pulses: Normal pulses.      Heart sounds: Normal heart sounds.   Pulmonary:      Effort: Pulmonary effort is normal.      Breath sounds: Normal breath sounds.   Musculoskeletal:         General: Normal range of motion.   Skin:     General: Skin is warm and dry.   Neurological:      Mental Status: She is alert and oriented to person, place, and time.   Psychiatric:         Mood and Affect: Mood normal.         Behavior: Behavior normal.         Thought Content: " Thought content normal.         Judgment: Judgment normal.          Lab Review:  Last lab work was 2021         Assessment and Plan     Diagnoses and all orders for this visit:    1. Mild intermittent asthma without complication (Primary)  -     budesonide-formoterol (Symbicort) 160-4.5 MCG/ACT inhaler; Inhale 2 puffs 2 (Two) Times a Day for 30 days.  Dispense: 10.2 g; Refill: 5  -     albuterol sulfate HFA (ProAir HFA) 108 (90 Base) MCG/ACT inhaler; Inhale 2 puffs Every 4 (Four) Hours As Needed for Wheezing for up to 30 days.  Dispense: 8.5 g; Refill: 5  -     CBC (No Diff)  -     Comprehensive Metabolic Panel    2. Encounter for medication refill    3. Dizziness  -     TSH    4. Shaky  -     TSH  -     Hemoglobin A1c    5. BMI 21.0-21.9, adult    6. Encounter for vitamin deficiency screening  -     Folate  -     Iron  -     Vitamin B12  -     Vitamin D,25-Hydroxy    7. Screening for lipid disorders  -     Lipid Panel    8. Screening for diabetes mellitus  -     Hemoglobin A1c    9. Feeling tired  -     Folate  -     Iron  -     Vitamin B12  -     Vitamin D,25-Hydroxy  -     TSH    Plan  Refills sent to pharmacy regarding her asthma.  Asthma is well controlled/stable when on Symbicort.  Continue coughing and deep breathing exercises.    Patient will have labs obtained today and will notify of results.    Discussed with patient that her dizziness and shaky episodes could be due to a number of reasons.  I would recommend she continue to follow a healthy well-rounded diet.  I would consider adding Pedialyte or Gatorade with her hydration to make sure that she is receiving plenty of electrolytes as well.  Her symptoms could also be anxiety related.  I would highly recommend she continue with the hydroxyzine even a half or quarter of a dose to see if this will assist with symptoms.  We did discuss that anxiety could manifest itself as physical symptoms very similar to what she is describing.  Over the next few weeks out  recommend she consider if she would like to start on something once daily    Go to ER if any condition worsens or severe    We will plan to follow-up in 4 weeks    She will make follow-up appointment with Dr. Duval as she is her PCP    Follow Up  Return for follow up 4 weeks. needs annual visit as well.    JENNIFER Ferguson    Part of this note may be an electronic transcription/translation of spoken language to printed text using the Dragon Dictation System.

## 2022-11-11 LAB
25(OH)D3 SERPL-MCNC: 28.8 NG/ML (ref 30–100)
ALBUMIN SERPL-MCNC: 4.5 G/DL (ref 3.5–5.2)
ALBUMIN/GLOB SERPL: 1.7 G/DL
ALP SERPL-CCNC: 59 U/L (ref 39–117)
ALT SERPL W P-5'-P-CCNC: 9 U/L (ref 1–33)
ANION GAP SERPL CALCULATED.3IONS-SCNC: 10.8 MMOL/L (ref 5–15)
AST SERPL-CCNC: 19 U/L (ref 1–32)
BILIRUB SERPL-MCNC: 0.3 MG/DL (ref 0–1.2)
BUN SERPL-MCNC: 12 MG/DL (ref 6–20)
BUN/CREAT SERPL: 14 (ref 7–25)
CALCIUM SPEC-SCNC: 9.5 MG/DL (ref 8.6–10.5)
CHLORIDE SERPL-SCNC: 100 MMOL/L (ref 98–107)
CHOLEST SERPL-MCNC: 161 MG/DL (ref 0–200)
CO2 SERPL-SCNC: 28.2 MMOL/L (ref 22–29)
CREAT SERPL-MCNC: 0.86 MG/DL (ref 0.57–1)
EGFRCR SERPLBLD CKD-EPI 2021: 93.3 ML/MIN/1.73
FOLATE SERPL-MCNC: 4.75 NG/ML (ref 4.78–24.2)
GLOBULIN UR ELPH-MCNC: 2.7 GM/DL
GLUCOSE SERPL-MCNC: 79 MG/DL (ref 65–99)
HDLC SERPL-MCNC: 48 MG/DL (ref 40–60)
IRON 24H UR-MRATE: 57 MCG/DL (ref 37–145)
LDLC SERPL CALC-MCNC: 99 MG/DL (ref 0–100)
LDLC/HDLC SERPL: 2.06 {RATIO}
POTASSIUM SERPL-SCNC: 3.7 MMOL/L (ref 3.5–5.2)
PROT SERPL-MCNC: 7.2 G/DL (ref 6–8.5)
SODIUM SERPL-SCNC: 139 MMOL/L (ref 136–145)
TRIGL SERPL-MCNC: 70 MG/DL (ref 0–150)
TSH SERPL DL<=0.05 MIU/L-ACNC: 0.78 UIU/ML (ref 0.27–4.2)
VIT B12 BLD-MCNC: 306 PG/ML (ref 211–946)
VLDLC SERPL-MCNC: 14 MG/DL (ref 5–40)

## 2022-12-08 ENCOUNTER — OFFICE VISIT (OUTPATIENT)
Dept: INTERNAL MEDICINE | Facility: CLINIC | Age: 32
End: 2022-12-08

## 2022-12-08 VITALS
WEIGHT: 117 LBS | OXYGEN SATURATION: 98 % | BODY MASS INDEX: 21.53 KG/M2 | SYSTOLIC BLOOD PRESSURE: 98 MMHG | HEIGHT: 62 IN | DIASTOLIC BLOOD PRESSURE: 62 MMHG | HEART RATE: 73 BPM

## 2022-12-08 DIAGNOSIS — E53.8 FOLATE DEFICIENCY: ICD-10-CM

## 2022-12-08 DIAGNOSIS — R42 DIZZINESS: ICD-10-CM

## 2022-12-08 DIAGNOSIS — E55.9 VITAMIN D DEFICIENCY: Primary | ICD-10-CM

## 2022-12-08 DIAGNOSIS — F41.9 ANXIETY: ICD-10-CM

## 2022-12-08 PROCEDURE — 99213 OFFICE O/P EST LOW 20 MIN: CPT | Performed by: NURSE PRACTITIONER

## 2022-12-08 NOTE — PROGRESS NOTES
"    Office Note     Name: Lillie Dos Santos    : 1990     MRN: [id number]     Chief Complaint  Follow-up (Recent lab work- vit D def and folate def. Follow up dizziness) and Anxiety    Subjective     History of Present Illness:  Lillie Dos Santos is a 32 y.o. female who presents today for follow-up    Patient did have labs obtained at previous visit and it was noted that she was deficient in folate and vitamin D.  No medication was sent to the pharmacy as she was just slightly low.  Did recommend a over-the-counter supplement including a multivitamin as well.  She was notified via telephone encounter about her labs    Regarding her dizziness, patient does report that since previous visit she has been doing better about eating on a regular schedule.  She still has occasional dizziness but not as often as previous.  She feels that her dizziness is more related to anxiety as she \"feels like she has a full plate with another full plate on top of it.\"  She is unsure if the anxiety could be due to her feeling depressed or having depression due to knowing that she has anxiety.  In 2021 she did try Zoloft but it caused psychosis symptoms.  She is also tried Prozac but she became food of cyst and gained about 15 pounds in a month.  She does report some high highs and low lows.  She states that a diagnosis of bipolar could not be off the table.  She does report some irritability and a short fuse.  She is not opposed to a referral to behavioral health for starting on a new medication.    Review of Systems   Constitutional: Negative for chills, fatigue and fever.   HENT: Negative for sore throat.    Eyes: Negative for visual disturbance.   Respiratory: Negative for cough and shortness of breath.    Cardiovascular: Negative for chest pain.   Gastrointestinal: Negative for abdominal pain.   Skin: Negative for color change.   Allergic/Immunologic: Negative for immunocompromised state.   Neurological: Negative for headaches. "   Psychiatric/Behavioral: Negative for behavioral problems. The patient is nervous/anxious.        Past Medical History:   Diagnosis Date   • Abnormal Pap smear of cervix    • Asthma    • Depression 8/24/2016     start prozac 10 mg po qd   • Esophagitis, reflux    • Headache    • HSV-2 infection    • Hypoglycemia    • Proteinuria 8/25/2016   • Seizures (HCC)     had 1 or 2 -in teens, unsure of etiology    • UTI (urinary tract infection)        Past Surgical History:   Procedure Laterality Date   • COLPOSCOPY      after first pregnancy    • DENTAL PROCEDURE     • FINGER SURGERY Right     forefinger   • VAGINAL DELIVERY     • WISDOM TOOTH EXTRACTION         Social History     Socioeconomic History   • Marital status:      Spouse name: Raffaele Spears    • Number of children: 1   • Highest education level: Associate degree: occupational, technical, or vocational program   Tobacco Use   • Smoking status: Former     Years: 2.00     Types: Cigarettes     Quit date: 1/1/2012     Years since quitting: 10.9   • Smokeless tobacco: Never   • Tobacco comments:     1 pack every 2 weeks for 2 years during highschool   Vaping Use   • Vaping Use: Never used   Substance and Sexual Activity   • Alcohol use: Not Currently   • Drug use: No   • Sexual activity: Yes     Partners: Male     Birth control/protection: None         Current Outpatient Medications:   •  albuterol sulfate HFA (ProAir HFA) 108 (90 Base) MCG/ACT inhaler, Inhale 2 puffs Every 4 (Four) Hours As Needed for Wheezing for up to 30 days., Disp: 8.5 g, Rfl: 5  •  budesonide-formoterol (Symbicort) 160-4.5 MCG/ACT inhaler, Inhale 2 puffs 2 (Two) Times a Day for 30 days., Disp: 10.2 g, Rfl: 5  •  Cetirizine HCl 10 MG capsule, Take  by mouth., Disp: , Rfl:   •  hydrOXYzine (ATARAX) 25 MG tablet, Take 1 tablet by mouth 3 (Three) Times a Day As Needed for Anxiety., Disp: 90 tablet, Rfl: 1  •  ondansetron ODT (ZOFRAN-ODT) 4 MG disintegrating tablet, , Disp: , Rfl:   •   "promethazine (PHENERGAN) 25 MG tablet, , Disp: , Rfl:     Objective     Vital Signs  BP 98/62   Pulse 73   Ht 157.5 cm (62\")   Wt 53.1 kg (117 lb)   SpO2 98%   BMI 21.40 kg/m²   Estimated body mass index is 21.4 kg/m² as calculated from the following:    Height as of this encounter: 157.5 cm (62\").    Weight as of this encounter: 53.1 kg (117 lb).    BMI is within normal parameters. No other follow-up for BMI required.        Physical Exam  Vitals and nursing note reviewed.   Constitutional:       Appearance: Normal appearance.   HENT:      Head: Normocephalic and atraumatic.   Eyes:      Extraocular Movements: Extraocular movements intact.      Pupils: Pupils are equal, round, and reactive to light.   Cardiovascular:      Rate and Rhythm: Normal rate and regular rhythm.      Pulses: Normal pulses.      Heart sounds: Normal heart sounds.   Pulmonary:      Effort: Pulmonary effort is normal.      Breath sounds: Normal breath sounds.   Musculoskeletal:         General: Normal range of motion.   Skin:     General: Skin is warm and dry.   Neurological:      Mental Status: She is alert and oriented to person, place, and time.   Psychiatric:         Mood and Affect: Mood normal.         Behavior: Behavior normal.                 Assessment and Plan     Diagnoses and all orders for this visit:    1. Vitamin D deficiency (Primary)    2. Folate deficiency    3. Anxiety  -     Ambulatory Referral to Behavioral Health    4. Dizziness    5. BMI 21.0-21.9, adult    Plan  Discussed with patient to start on a once daily multivitamin regarding her vitamin D and folate deficiency.    Regarding her anxiety and depression.  She was referred to behavioral health.  We did discuss that it could take a few months to get into the specialist.  I did recommend a GeneSight test and patient will call insurance to see if this is covered.  She has tried 2 medications in the past and failed.  She was provided a 2-week sample of 1.5 mg of " Vraylar.  Patient will call back the office and let us know how she is doing.  She is heading out of town this weekend so she will wait to start the medication on Monday.    Go to ER if any condition worsens or severe or any thoughts of suicidal or homicidal ideation    Patient will hold on making an appointment and let us know how she is doing on the medication    Keep upcoming appointment with Dr. Duval in July    Follow Up  Return for will call and schedule as needed regarding medication review.    JENNIFER Ferguson    Part of this note may be an electronic transcription/translation of spoken language to printed text using the Dragon Dictation System.

## 2022-12-22 ENCOUNTER — OFFICE VISIT (OUTPATIENT)
Dept: BEHAVIORAL HEALTH | Facility: CLINIC | Age: 32
End: 2022-12-22

## 2022-12-22 DIAGNOSIS — F41.1 GENERALIZED ANXIETY DISORDER: Primary | ICD-10-CM

## 2022-12-22 DIAGNOSIS — F33.2 SEVERE EPISODE OF RECURRENT MAJOR DEPRESSIVE DISORDER, WITHOUT PSYCHOTIC FEATURES: ICD-10-CM

## 2022-12-22 PROCEDURE — 90791 PSYCH DIAGNOSTIC EVALUATION: CPT | Performed by: SOCIAL WORKER
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
## 2023-01-05 ENCOUNTER — OFFICE VISIT (OUTPATIENT)
Dept: BEHAVIORAL HEALTH | Facility: CLINIC | Age: 33
End: 2023-01-05
Payer: COMMERCIAL

## 2023-01-05 DIAGNOSIS — F41.1 GENERALIZED ANXIETY DISORDER: Primary | ICD-10-CM

## 2023-01-05 DIAGNOSIS — F33.2 SEVERE EPISODE OF RECURRENT MAJOR DEPRESSIVE DISORDER, WITHOUT PSYCHOTIC FEATURES: ICD-10-CM

## 2023-01-05 PROCEDURE — 90837 PSYTX W PT 60 MINUTES: CPT | Performed by: SOCIAL WORKER

## 2023-01-05 NOTE — PROGRESS NOTES
[facility] [place]                 Follow Up Adult      Follow Up Adult Note     Date:2023   Patient Name: Lillie Dos Santos  : 1990   MRN: [id number]   Time IN: 9:37 am   Time OUT: 10:48 am    Referring Provider: Ana Paula Dvual MD    Chief Complaint:      ICD-10-CM ICD-9-CM   1. Generalized anxiety disorder  F41.1 300.02   2. Severe episode of recurrent major depressive disorder, without psychotic features (HCC)  F33.2 296.33        History of Present Illness:   Lillie Dos Santos is a 32 y.o. female who is being seen today for follow up counseling for symptoms of anxiety and depression.  Patient reported slight improvement in regards to anxiety and depression.  Patient reported hair loss which they attributed to COVID and possibly stress.  Patient and clinician process patient stress over conflicts with their 9-year-old daughter and the resulting guilt the patient had for disciplining them.  Clinician provided psychoeducation regarding shaping desirable behavior and praising desire behavior as well as neutral behavior.  Patient was responsive to psychoeducation.  Patient and clinician processed patient maladaptive thought patterns related to low self image.  Patient and clinician identified core belief of \"I am not good enough.\".  Patient given a mood log for review at next session to address cognitive distortions and the appropriate alternate responses.  Plan:  Follow-up session scheduled for approximately 14 days after session.  Session will last between 40 and 60 minutes in duration.    Subjective               Patient's Support Network Includes: extended family    Functional Status: Mild impairment       Current Outpatient Medications:   •  budesonide-formoterol (Symbicort) 160-4.5 MCG/ACT inhaler, Inhale 2 puffs 2 (Two) Times a Day for 30 days., Disp: 10.2 g, Rfl: 5  •  Cetirizine HCl 10 MG capsule, Take  by mouth., Disp: , Rfl:   •  hydrOXYzine (ATARAX)  25 MG tablet, Take 1 tablet by mouth 3 (Three) Times a Day As Needed for Anxiety., Disp: 90 tablet, Rfl: 1  •  ondansetron ODT (ZOFRAN-ODT) 4 MG disintegrating tablet, , Disp: , Rfl:   •  promethazine (PHENERGAN) 25 MG tablet, , Disp: , Rfl:     Allergies   Allergen Reactions   • Amoxicillin GI Intolerance   • Penicillins GI Intolerance       Objective     Physical Exam:  Vital Signs: There were no vitals filed for this visit.  There is no height or weight on file to calculate BMI.     Mental Status Exam:   Hygiene:   good  Cooperation:  Cooperative  Eye Contact:  Good  Psychomotor Behavior:  Appropriate  Affect:  Full range  Mood: normal  Speech:  Normal  Thought Process:  Goal directed  Thought Content:  Normal  Suicidal:  None  Homicidal:  None  Hallucinations:  None  Delusion:  None  Memory:  Intact  Orientation:  Grossly intact  Reliability:  good  Insight:  Good  Judgement:  Good  Impulse Control:  Good  Physical/Medical Issues:  No      Assessment / Plan      Visit Diagnosis/Orders Placed This Visit:  Diagnoses and all orders for this visit:    1. Generalized anxiety disorder (Primary)    2. Severe episode of recurrent major depressive disorder, without psychotic features (HCC)          PLAN:  1. Safety: No acute safety concerns  2. Risk Assessment: Risk of self-harm acutely is low. Risk of self-harm chronically is also low, but could be further elevated in the event of treatment noncompliance and/or AODA.    Treatment Plan/Goals: Continue supportive psychotherapy efforts and medications as indicated. Treatment and medication options discussed during today's visit. Patient ackowledged and verbally consented to continue with current treatment plan and was educated on the importance of compliance with treatment and follow-up appointments. Patient seems reasonably able to adhere to treatment plan.      Assisted Patient in processing above session content; acknowledged and normalized patient’s thoughts, feelings,  and concerns.  Rationalized patient thought process regarding stress and anxiety caused by internal and external stimuli.      Allowed Patient to freely discuss issues  without interruption or judgement with unconditional positive regard, active listening skills, and empathy. Therapist provided a safe, confidential environment to facilitate the development of a positive therapeutic relationship and encouraged open, honest communication. Assisted Patient in identifying risk factors which would indicate the need for higher level of care including thoughts to harm self or others and/or self-harming behavior and encouraged Patient to contact this office, call 911, or present to the nearest emergency room should any of these events occur. Discussed crisis intervention services and means to access. Patient adamantly and convincingly denies current suicidal or homicidal ideation or perceptual disturbance. Assisted Patient in processing session content; acknowledged and normalized Patient’s thoughts, feelings, and concerns by utilizing a person-centered approach in efforts to build appropriate rapport and a positive therapeutic relationship with open and honest communication. .     Follow Up:   Return in about 2 weeks (around 1/19/2023).    Boaz Pabon LCSW

## 2023-02-02 ENCOUNTER — OFFICE VISIT (OUTPATIENT)
Dept: BEHAVIORAL HEALTH | Facility: CLINIC | Age: 33
End: 2023-02-02
Payer: COMMERCIAL

## 2023-02-02 DIAGNOSIS — F33.2 SEVERE EPISODE OF RECURRENT MAJOR DEPRESSIVE DISORDER, WITHOUT PSYCHOTIC FEATURES: ICD-10-CM

## 2023-02-02 DIAGNOSIS — F41.1 GENERALIZED ANXIETY DISORDER: Primary | ICD-10-CM

## 2023-02-02 PROCEDURE — 90837 PSYTX W PT 60 MINUTES: CPT | Performed by: SOCIAL WORKER

## 2023-02-02 NOTE — PROGRESS NOTES
[facility]                 Follow Up Adult      Follow Up Adult Note     Date:2023   Patient Name: Lillie Dos Santos  : 1990   MRN: [id number]   Time IN: 9:08 am    Time OUT: 10:06 am    Referring Provider: Ana Paula Duval MD    Chief Complaint:      ICD-10-CM ICD-9-CM   1. Generalized anxiety disorder  F41.1 300.02   2. Severe episode of recurrent major depressive disorder, without psychotic features (HCC)  F33.2 296.33        History of Present Illness:   Lillie Dos Santos is a 32 y.o. female who is being seen today for follow up counseling for symptoms of anxiety and depression.    Subjective               Patient's Support Network Includes: extended family    Functional Status: Mild impairment       Current Outpatient Medications:   •  budesonide-formoterol (Symbicort) 160-4.5 MCG/ACT inhaler, Inhale 2 puffs 2 (Two) Times a Day for 30 days., Disp: 10.2 g, Rfl: 5  •  Cetirizine HCl 10 MG capsule, Take  by mouth., Disp: , Rfl:   •  hydrOXYzine (ATARAX) 25 MG tablet, Take 1 tablet by mouth 3 (Three) Times a Day As Needed for Anxiety., Disp: 90 tablet, Rfl: 1  •  ondansetron ODT (ZOFRAN-ODT) 4 MG disintegrating tablet, , Disp: , Rfl:   •  promethazine (PHENERGAN) 25 MG tablet, , Disp: , Rfl:     Allergies   Allergen Reactions   • Amoxicillin GI Intolerance   • Penicillins GI Intolerance       Objective     Physical Exam:  Vital Signs: There were no vitals filed for this visit.  There is no height or weight on file to calculate BMI.     Mental Status Exam:   Hygiene:   good  Cooperation:  Cooperative  Eye Contact:  Good  Psychomotor Behavior:  Appropriate  Affect:  Full range  Mood: normal  Speech:  Normal  Thought Process:  Goal directed  Thought Content:  Normal  Suicidal:  None  Homicidal:  None  Hallucinations:  None  Delusion:  None  Memory:  Intact  Orientation:  Grossly intact  Reliability:  good  Insight:  Good  Judgement:  Good  Impulse Control:   Good  Physical/Medical Issues:  No      Assessment / Plan      Diagnosis:  Diagnoses and all orders for this visit:    1. Generalized anxiety disorder (Primary)    2. Severe episode of recurrent major depressive disorder, without psychotic features (HCC)    Patient presented for follow-up session with clinician.  Patient and clinician processed stress related to patient's work.  Patient and clinician processed stress related to parenting struggles with the patient.  Patient identified their son sustaining a serious cut on their face which needed surgical repair and their daughter requiring lice at school as being the primary stressful struggles related to their home life in between sessions.  Patient reported perceived power struggles at work as being their primary triggers for anxiety in their professional life.  Clinician introduced a solution focused intervention which focused on assertive communication and segmenting of the long-term goal of achieving financial security through open communication with her current work superior to determine whether or not their current position was optimal for both their mental and financial security.  Clinician utilized active listening and reflective response to convey empathy and support to patient.      Progress toward goal: Patient has showed steady progress towards the long-term goals of improving self image and overall functioning since beginning treatment.    Prognosis: Prognosis for patient progress is good patient will be reassessed for anxiety and depression during next follow-up session.    PLAN:  Patient will be reassessed for anxiety and depression during next follow-up session which will occur in 14 days and will last from 40 to 60 minutes in duration.    1. Safety: No acute safety concerns  2. Risk Assessment: Risk of self-harm acutely is low. Risk of self-harm chronically is also low, but could be further elevated in the event of treatment noncompliance and/or  AODA.    Treatment Plan/Goals: Continue supportive psychotherapy efforts and medications as indicated. Treatment and medication options discussed during today's visit. Patient ackowledged and verbally consented to continue with current treatment plan and was educated on the importance of compliance with treatment and follow-up appointments. Patient seems reasonably able to adhere to treatment plan.      Assisted Patient in processing above session content; acknowledged and normalized patient’s thoughts, feelings, and concerns.  Rationalized patient thought process regarding anxiety and depression.      Allowed Patient to freely discuss issues  without interruption or judgement with unconditional positive regard, active listening skills, and empathy. Therapist provided a safe, confidential environment to facilitate the development of a positive therapeutic relationship and encouraged open, honest communication. Assisted Patient in identifying risk factors which would indicate the need for higher level of care including thoughts to harm self or others and/or self-harming behavior and encouraged Patient to contact this office, call 911, or present to the nearest emergency room should any of these events occur. Discussed crisis intervention services and means to access. Patient adamantly and convincingly denies current suicidal or homicidal ideation or perceptual disturbance. Assisted Patient in processing session content; acknowledged and normalized Patient’s thoughts, feelings, and concerns by utilizing a person-centered approach in efforts to build appropriate rapport and a positive therapeutic relationship with open and honest communication. .     Follow Up:   Return in about 2 weeks (around 2/16/2023).    Boaz Pabon LCSW

## 2023-02-09 ENCOUNTER — HOSPITAL ENCOUNTER (OUTPATIENT)
Dept: GENERAL RADIOLOGY | Facility: HOSPITAL | Age: 33
Discharge: HOME | End: 2023-02-09
Payer: COMMERCIAL

## 2023-02-09 ENCOUNTER — LAB (OUTPATIENT)
Dept: LAB | Facility: HOSPITAL | Age: 33
End: 2023-02-09
Payer: COMMERCIAL

## 2023-02-09 ENCOUNTER — OFFICE VISIT (OUTPATIENT)
Dept: INTERNAL MEDICINE | Facility: CLINIC | Age: 33
End: 2023-02-09
Payer: COMMERCIAL

## 2023-02-09 VITALS
HEIGHT: 62 IN | OXYGEN SATURATION: 100 % | SYSTOLIC BLOOD PRESSURE: 118 MMHG | WEIGHT: 120 LBS | DIASTOLIC BLOOD PRESSURE: 60 MMHG | BODY MASS INDEX: 22.08 KG/M2 | HEART RATE: 71 BPM

## 2023-02-09 DIAGNOSIS — R07.9 CHEST PAIN, UNSPECIFIED TYPE: ICD-10-CM

## 2023-02-09 DIAGNOSIS — M94.0 COSTOCHONDRITIS: Primary | ICD-10-CM

## 2023-02-09 LAB
DEPRECATED RDW RBC AUTO: 39.4 FL (ref 37–54)
ERYTHROCYTE [DISTWIDTH] IN BLOOD BY AUTOMATED COUNT: 12.1 % (ref 12.3–15.4)
HCT VFR BLD AUTO: 38.7 % (ref 34–46.6)
HGB BLD-MCNC: 13.1 G/DL (ref 12–15.9)
MCH RBC QN AUTO: 29.6 PG (ref 26.6–33)
MCHC RBC AUTO-ENTMCNC: 33.9 G/DL (ref 31.5–35.7)
MCV RBC AUTO: 87.6 FL (ref 79–97)
PLATELET # BLD AUTO: 302 10*3/MM3 (ref 140–450)
PMV BLD AUTO: 10 FL (ref 6–12)
RBC # BLD AUTO: 4.42 10*6/MM3 (ref 3.77–5.28)
WBC NRBC COR # BLD: 7.27 10*3/MM3 (ref 3.4–10.8)

## 2023-02-09 PROCEDURE — 99214 OFFICE O/P EST MOD 30 MIN: CPT | Performed by: NURSE PRACTITIONER

## 2023-02-09 PROCEDURE — 71046 X-RAY EXAM CHEST 2 VIEWS: CPT

## 2023-02-09 PROCEDURE — 36415 COLL VENOUS BLD VENIPUNCTURE: CPT

## 2023-02-09 PROCEDURE — 93000 ELECTROCARDIOGRAM COMPLETE: CPT | Performed by: NURSE PRACTITIONER

## 2023-02-09 PROCEDURE — 80048 BASIC METABOLIC PNL TOTAL CA: CPT

## 2023-02-09 PROCEDURE — 85027 COMPLETE CBC AUTOMATED: CPT

## 2023-02-09 RX ORDER — ALBUTEROL SULFATE 90 UG/1
AEROSOL, METERED RESPIRATORY (INHALATION)
COMMUNITY
Start: 2023-01-14

## 2023-02-09 NOTE — PROGRESS NOTES
Follow Up Office Visit      Date: 2023   Patient Name: Lillie Dos Santos  : 1990   MRN: [id number]     Chief Complaint:    Chief Complaint   Patient presents with   • Muscle Pain   • Chest Pain     Tightness         History of Present Illness: Lillie Dos Santos is a 32 y.o. female who is here today to for an acute visit r/t ongoing chest pain, possibly musculoskeletal in nature, and inability to feel like she can get a deep breath.  She describes herself as an active individual.  Was playing basketball with her nephew on , and subsequently a few days later developed chest wall pain on the left side.  Since that time it has been increasing in severity and frequency to the point she wanted to get it checked out.  She attempted to go to urgent care, but was deferred to the emergency room for potential cardiac work-up.  She was seen at [facility] and had an EKG and vital signs checked as work-up.  EKG was normal.  She was prescribed muscle relaxers which cause drowsiness, but not necessarily take the pain away.  She continues to have chest pain that has moved up to the left center of her chest and also continues to experience the sensation of not being able to take in a deep breath.  The pain is constant, but at times it is intermittently more severe than others.  Describes as sharp, stabbing-like sensations.  The pain is reproducible with palpation.      Subjective      Review of Systems:   Review of Systems   Constitutional: Negative for activity change.   HENT: Negative.    Respiratory: Negative for chest tightness and shortness of breath.    Cardiovascular: Negative for chest pain.   Musculoskeletal: Positive for myalgias and neck pain.       I have reviewed the patients family history, social history, past medical history, past surgical history and have updated it as appropriate.     Medications:     Current Outpatient Medications:   •  Cetirizine HCl 10 MG capsule, Take  by  "mouth., Disp: , Rfl:   •  hydrOXYzine (ATARAX) 25 MG tablet, Take 1 tablet by mouth 3 (Three) Times a Day As Needed for Anxiety., Disp: 90 tablet, Rfl: 1  •  Ventolin  (90 Base) MCG/ACT inhaler, , Disp: , Rfl:   •  budesonide-formoterol (Symbicort) 160-4.5 MCG/ACT inhaler, Inhale 2 puffs 2 (Two) Times a Day for 30 days., Disp: 10.2 g, Rfl: 5  •  ondansetron ODT (ZOFRAN-ODT) 4 MG disintegrating tablet, , Disp: , Rfl:   •  promethazine (PHENERGAN) 25 MG tablet, , Disp: , Rfl:     Allergies:   Allergies   Allergen Reactions   • Amoxicillin GI Intolerance   • Penicillins GI Intolerance       Objective     Physical Exam: Please see above  Vital Signs:   Vitals:    02/09/23 1204   BP: 118/60   Pulse: 71   SpO2: 100%   Weight: 54.4 kg (120 lb)   Height: 157.5 cm (62\")   PainSc: 0-No pain     Body mass index is 21.95 kg/m².    Physical Exam  Vitals and nursing note reviewed.   Constitutional:       General: She is not in acute distress.     Appearance: Normal appearance. She is normal weight. She is not ill-appearing or diaphoretic.   HENT:      Head: Normocephalic and atraumatic.      Mouth/Throat:      Mouth: Mucous membranes are moist.      Pharynx: Oropharynx is clear.   Cardiovascular:      Rate and Rhythm: Normal rate and regular rhythm.      Heart sounds: Normal heart sounds.   Pulmonary:      Effort: Pulmonary effort is normal. No respiratory distress.      Breath sounds: Normal breath sounds.   Chest:       Skin:     General: Skin is warm and dry.   Neurological:      Mental Status: She is alert and oriented to person, place, and time.           ECG 12 Lead    Date/Time: 2/9/2023 12:42 PM  Performed by: Mónica Zhao APRN  Authorized by: Mónica Zhao APRN   Comparison: not compared with previous ECG   Previous ECG: no previous ECG available  Rhythm: sinus rhythm  Rate: normal  BPM: 63    Clinical impression: normal ECG            Results:   Imaging:     Labs:    Latest Reference Range & " Units 11/10/22 11:06   Glucose 65 - 99 mg/dL 79   Sodium 136 - 145 mmol/L 139   Potassium 3.5 - 5.2 mmol/L 3.7   CO2 22.0 - 29.0 mmol/L 28.2   Chloride 98 - 107 mmol/L 100   Anion Gap 5.0 - 15.0 mmol/L 10.8   Creatinine 0.57 - 1.00 mg/dL 0.86   BUN 6 - 20 mg/dL 12   BUN/Creatinine Ratio 7.0 - 25.0  14.0   Calcium 8.6 - 10.5 mg/dL 9.5   eGFR >60.0 mL/min/1.73 93.3   Alkaline Phosphatase 39 - 117 U/L 59   Total Protein 6.0 - 8.5 g/dL 7.2   ALT (SGPT) 1 - 33 U/L 9   AST (SGOT) 1 - 32 U/L 19   Total Bilirubin 0.0 - 1.2 mg/dL 0.3   Albumin 3.50 - 5.20 g/dL 4.50   Globulin gm/dL 2.7   A/G Ratio g/dL 1.7   Hemoglobin A1C 4.80 - 5.60 % 5.40   TSH Baseline 0.270 - 4.200 uIU/mL 0.778   Iron 37 - 145 mcg/dL 57   Folate 4.78 - 24.20 ng/mL 4.75 (L)   Total Cholesterol 0 - 200 mg/dL 161   HDL Cholesterol 40 - 60 mg/dL 48   LDL Cholesterol  0 - 100 mg/dL 99   VLDL Cholesterol 5 - 40 mg/dL 14   Triglycerides 0 - 150 mg/dL 70   LDL/HDL Ratio  2.06   Vitamin B-12 211 - 946 pg/mL 306   25 Hydroxy, Vitamin D 30.0 - 100.0 ng/ml 28.8 (L)   WBC 3.40 - 10.80 10*3/mm3 6.62   RBC 3.77 - 5.28 10*6/mm3 4.53   Hemoglobin 12.0 - 15.9 g/dL 13.4   Hematocrit 34.0 - 46.6 % 40.9   RDW 12.3 - 15.4 % 12.3   MCV 79.0 - 97.0 fL 90.3   MCH 26.6 - 33.0 pg 29.6   MCHC 31.5 - 35.7 g/dL 32.8   MPV 6.0 - 12.0 fL 10.1   Platelets 140 - 450 10*3/mm3 327   RDW-SD 37.0 - 54.0 fl 40.4   (L): Data is abnormally low    Assessment / Plan      Assessment/Plan:   Diagnoses and all orders for this visit:    1. Costochondritis (Primary)  -NSAIDs PRN, stretches   -f/u if worsening or if symptoms do not improve     2. Chest pain, unspecified type  -     XR Chest PA & Lateral; Future  -     ECG 12 Lead  -     CBC (No Diff); Future  -     Basic metabolic panel; Future         Follow Up:   Return if symptoms worsen or fail to improve, for Next scheduled follow up.    JENNIFER Kwon  [facility]

## 2023-02-10 LAB
ANION GAP SERPL CALCULATED.3IONS-SCNC: 10 MMOL/L (ref 5–15)
BUN SERPL-MCNC: 14 MG/DL (ref 6–20)
BUN/CREAT SERPL: 15.4 (ref 7–25)
CALCIUM SPEC-SCNC: 9.9 MG/DL (ref 8.6–10.5)
CHLORIDE SERPL-SCNC: 101 MMOL/L (ref 98–107)
CO2 SERPL-SCNC: 27 MMOL/L (ref 22–29)
CREAT SERPL-MCNC: 0.91 MG/DL (ref 0.57–1)
EGFRCR SERPLBLD CKD-EPI 2021: 87.2 ML/MIN/1.73
GLUCOSE SERPL-MCNC: 75 MG/DL (ref 65–99)
POTASSIUM SERPL-SCNC: 3.9 MMOL/L (ref 3.5–5.2)
SODIUM SERPL-SCNC: 138 MMOL/L (ref 136–145)

## 2023-02-23 ENCOUNTER — TELEPHONE (OUTPATIENT)
Dept: BEHAVIORAL HEALTH | Facility: CLINIC | Age: 33
End: 2023-02-23

## 2023-02-23 NOTE — TELEPHONE ENCOUNTER
*HUB TO READ* I called and LM for patient to return my call to reschedule her appointment to next week from today. Boaz Pabon LCSW will be out of the office today.

## 2023-02-27 ENCOUNTER — OFFICE VISIT (OUTPATIENT)
Dept: INTERNAL MEDICINE | Facility: CLINIC | Age: 33
End: 2023-02-27
Payer: COMMERCIAL

## 2023-02-27 ENCOUNTER — OFFICE VISIT (OUTPATIENT)
Dept: BEHAVIORAL HEALTH | Facility: CLINIC | Age: 33
End: 2023-02-27
Payer: COMMERCIAL

## 2023-02-27 VITALS
BODY MASS INDEX: 21.53 KG/M2 | OXYGEN SATURATION: 99 % | WEIGHT: 117 LBS | DIASTOLIC BLOOD PRESSURE: 72 MMHG | HEART RATE: 61 BPM | HEIGHT: 62 IN | SYSTOLIC BLOOD PRESSURE: 110 MMHG

## 2023-02-27 DIAGNOSIS — R06.02 SHORTNESS OF BREATH: Primary | ICD-10-CM

## 2023-02-27 DIAGNOSIS — F41.1 GENERALIZED ANXIETY DISORDER: Primary | ICD-10-CM

## 2023-02-27 DIAGNOSIS — M94.0 COSTOCHONDRITIS: ICD-10-CM

## 2023-02-27 DIAGNOSIS — F33.2 SEVERE EPISODE OF RECURRENT MAJOR DEPRESSIVE DISORDER, WITHOUT PSYCHOTIC FEATURES: ICD-10-CM

## 2023-02-27 DIAGNOSIS — F41.9 ANXIETY: ICD-10-CM

## 2023-02-27 DIAGNOSIS — J45.20 MILD INTERMITTENT ASTHMA WITHOUT COMPLICATION: ICD-10-CM

## 2023-02-27 LAB
EXPIRATION DATE: NORMAL
EXPIRATION DATE: NORMAL
FLUAV AG NPH QL: NEGATIVE
FLUBV AG NPH QL: NEGATIVE
INTERNAL CONTROL: NORMAL
INTERNAL CONTROL: NORMAL
Lab: NORMAL
Lab: NORMAL
SARS-COV-2 AG UPPER RESP QL IA.RAPID: NOT DETECTED

## 2023-02-27 PROCEDURE — 90834 PSYTX W PT 45 MINUTES: CPT | Performed by: SOCIAL WORKER

## 2023-02-27 PROCEDURE — 99214 OFFICE O/P EST MOD 30 MIN: CPT | Performed by: NURSE PRACTITIONER

## 2023-02-27 PROCEDURE — 87804 INFLUENZA ASSAY W/OPTIC: CPT | Performed by: NURSE PRACTITIONER

## 2023-02-27 PROCEDURE — 87426 SARSCOV CORONAVIRUS AG IA: CPT | Performed by: NURSE PRACTITIONER

## 2023-02-27 RX ORDER — BUSPIRONE HYDROCHLORIDE 5 MG/1
5 TABLET ORAL 3 TIMES DAILY
Qty: 90 TABLET | Refills: 0 | Status: SHIPPED | OUTPATIENT
Start: 2023-02-27 | End: 2023-03-24 | Stop reason: SDUPTHER

## 2023-02-27 NOTE — PROGRESS NOTES
"    Office Note     Name: Lillie Dos Santos    : 1990     MRN: [id number]     Chief Complaint  Breathing Problem    Subjective     History of Present Illness:  Lillie Dos Santos is a 32 y.o. female who presents today for follow-up on concerns with breathing.    Patient states at the beginning of February she noticed some chest pain after playing basketball with her nephew.  It was a sharp pain on the left side of her chest located closer to her left nipple.  She felt that she was not able to take a full deep breath at that time.  It only happen intermittently.  She did go to urgent care with recommendations to go to the ER.  It does appear that cardiac work-up was negative.  At that time she thought she was having a heart attack.  The ER diagnosed her with a pulled muscle and she excepted this.  Since that time she has had continued \"weird\" pains.  She saw Mónica Zhao on  for similar symptoms for which a CBC, BMP, x-ray was negative.  Patient denies that she has a chest pain anymore but still feels that she is not able to take a full deep breath.  She describes it as air hunger.  She does have a smoking history but not significant.  Occasionally smokes cigarettes or vapes.  No significant marijuana or other drug use such as cocaine.  Denies any particular triggering event.  She is unsure if it could be anxiety related.  Her oxygen saturation when these symptoms happen is at 99%.  She will do breathing treatments but does not feel that helps.  This has been causing her significant stress in her life.  She will do physical activity and denies that she has any trouble breathing during physical activity.  She does currently see Boaz related to anxiety.  She has discussed the symptoms with her counselor who states that they could possibly be anxiety related as he does know more of what is going on right now in her life.  Patient does have a history of asthma for which she is on Symbicort    Overall " patient is having a difficult time excepting that it is possibly related to anxiety and would like further testing and uses an albuterol inhaler as needed.  She does not particularly feel like the hydroxyzine helps with anxiety.    Review of Systems   Constitutional: Negative for chills, fatigue and fever.   HENT: Negative for sore throat.    Eyes: Negative for visual disturbance.   Respiratory: Positive for shortness of breath. Negative for cough.    Cardiovascular: Negative for chest pain.   Gastrointestinal: Negative for abdominal pain.   Skin: Negative for color change.   Allergic/Immunologic: Negative for immunocompromised state.   Neurological: Negative for headaches.   Psychiatric/Behavioral: Negative for behavioral problems.       Past Medical History:   Diagnosis Date   • Abnormal Pap smear of cervix    • Asthma    • Depression 2016     start prozac 10 mg po qd   • Esophagitis, reflux    • Headache    • HSV-2 infection    • Hypoglycemia    • Proteinuria 2016   • Seizures (HCC)     had 1 or 2 -in teens, unsure of etiology    • UTI (urinary tract infection)        Past Surgical History:   Procedure Laterality Date   • COLPOSCOPY      after first pregnancy    • DENTAL PROCEDURE     • FINGER SURGERY Right     forefinger   • VAGINAL DELIVERY     • WISDOM TOOTH EXTRACTION         Social History     Socioeconomic History   • Marital status:      Spouse name: Raffaele Spears    • Number of children: 1   • Highest education level: Associate degree: occupational, technical, or vocational program   Tobacco Use   • Smoking status: Former     Years: 2.00     Types: Cigarettes     Quit date: 2012     Years since quittin.1   • Smokeless tobacco: Never   • Tobacco comments:     1 pack every 2 weeks for 2 years during highschool   Vaping Use   • Vaping Use: Never used   Substance and Sexual Activity   • Alcohol use: Not Currently   • Drug use: No   • Sexual activity: Yes     Partners: Male     Birth  "control/protection: None         Current Outpatient Medications:   •  Cetirizine HCl 10 MG capsule, Take  by mouth., Disp: , Rfl:   •  hydrOXYzine (ATARAX) 25 MG tablet, Take 1 tablet by mouth 3 (Three) Times a Day As Needed for Anxiety., Disp: 90 tablet, Rfl: 1  •  ondansetron ODT (ZOFRAN-ODT) 4 MG disintegrating tablet, , Disp: , Rfl:   •  promethazine (PHENERGAN) 25 MG tablet, , Disp: , Rfl:   •  Ventolin  (90 Base) MCG/ACT inhaler, , Disp: , Rfl:   •  budesonide-formoterol (Symbicort) 160-4.5 MCG/ACT inhaler, Inhale 2 puffs 2 (Two) Times a Day for 30 days., Disp: 10.2 g, Rfl: 5  •  busPIRone (BUSPAR) 5 MG tablet, Take 1 tablet by mouth 3 (Three) Times a Day for 30 days., Disp: 90 tablet, Rfl: 0    Objective     Vital Signs  /72   Pulse 61   Ht 157.5 cm (62\")   Wt 53.1 kg (117 lb)   SpO2 99%   BMI 21.40 kg/m²   Estimated body mass index is 21.4 kg/m² as calculated from the following:    Height as of this encounter: 157.5 cm (62\").    Weight as of this encounter: 53.1 kg (117 lb).    BMI is within normal parameters. No other follow-up for BMI required.      PHQ-9 Depression Screening  Little interest or pleasure in doing things?     Feeling down, depressed, or hopeless?     Trouble falling or staying asleep, or sleeping too much?     Feeling tired or having little energy?     Poor appetite or overeating?     Feeling bad about yourself - or that you are a failure or have let yourself or your family down?     Trouble concentrating on things, such as reading the newspaper or watching television?     Moving or speaking so slowly that other people could have noticed? Or the opposite - being so fidgety or restless that you have been moving around a lot more than usual?     Thoughts that you would be better off dead, or of hurting yourself in some way?     PHQ-9 Total Score     If you checked off any problems, how difficult have these problems made it for you to do your work, take care of things at " home, or get along with other people?       PHQ-9 Total Score:      DI-7       Physical Exam  Vitals and nursing note reviewed.   Constitutional:       Appearance: Normal appearance.   HENT:      Head: Normocephalic and atraumatic.   Eyes:      Extraocular Movements: Extraocular movements intact.      Pupils: Pupils are equal, round, and reactive to light.   Cardiovascular:      Rate and Rhythm: Normal rate and regular rhythm.      Pulses: Normal pulses.      Heart sounds: Normal heart sounds, S1 normal and S2 normal. Heart sounds not distant. No murmur heard.  Pulmonary:      Effort: Pulmonary effort is normal. No tachypnea or respiratory distress.      Breath sounds: Normal breath sounds.      Comments: No cough noted on exam.  Musculoskeletal:         General: Normal range of motion.   Skin:     General: Skin is warm and dry.   Neurological:      Mental Status: She is alert and oriented to person, place, and time.   Psychiatric:         Attention and Perception: Attention normal.         Mood and Affect: Mood is anxious.         Speech: Speech normal.         Behavior: Behavior normal.         Cognition and Memory: Cognition normal.              Assessment and Plan     Diagnoses and all orders for this visit:    1. Shortness of breath (Primary)  -     POCT Influenza A/B  -     POCT SARS-CoV-2 Antigen RIANNA  -     Full Pulmonary Function Test With Bronchodilator; Future  -     CT Chest With Contrast; Future    2. Anxiety  -     busPIRone (BUSPAR) 5 MG tablet; Take 1 tablet by mouth 3 (Three) Times a Day for 30 days.  Dispense: 90 tablet; Refill: 0    3. Mild intermittent asthma without complication  -     Full Pulmonary Function Test With Bronchodilator; Future  -     CT Chest With Contrast; Future    4. Costochondritis    Plan  Discussed with patient that we will continue to rule out other factors other than anxiety.  She has had the cardiac work-up that was completed at the ER which was resulted as  negative    Will order pulmonary function test and CT of the chest for further evaluation    Continue to use breathing treatments and albuterol inhaler as needed.  Continue the Symbicort as prescribed    Patient overall does not feel that the hydroxyzine is helping so we will start patient on BuSpar.  She will start this is once daily and increase as tolerates.  We did discuss that this is a low dose so we do have room to move up in the dosage    Go to ER if any condition worsens or severe    Patient will be called about date time and location of upcoming procedures and will be notified of results as they return    We will schedule follow-up with Dr. Duval in about 4 weeks for follow-up    Follow Up  Return for 4 week follow up withDr. Duval.    JENNIFER Ferguson    Part of this note may be an electronic transcription/translation of spoken language to printed text using the Dragon Dictation System.

## 2023-02-27 NOTE — PROGRESS NOTES
[facility] [place]                 Follow Up Adult      Follow Up Adult Note     Date:2023   Patient Name: Lillie Dos Santos  : 1990   MRN: [id number]   Time IN: 3:35 pm  Time OUT: 4:20 pm    Referring Provider: Ana Paula Duval MD    Chief Complaint:      ICD-10-CM ICD-9-CM   1. Generalized anxiety disorder  F41.1 300.02   2. Severe episode of recurrent major depressive disorder, without psychotic features (HCC)  F33.2 296.33        History of Present Illness:   Lillie Dos Santos is a 32 y.o. female who is being seen today for follow up counseling for anxiety and depression.    Subjective               Patient's Support Network Includes: extended family    Functional Status: Mild impairment       Current Outpatient Medications:   •  budesonide-formoterol (Symbicort) 160-4.5 MCG/ACT inhaler, Inhale 2 puffs 2 (Two) Times a Day for 30 days., Disp: 10.2 g, Rfl: 5  •  busPIRone (BUSPAR) 5 MG tablet, Take 1 tablet by mouth 3 (Three) Times a Day for 30 days., Disp: 90 tablet, Rfl: 0  •  Cetirizine HCl 10 MG capsule, Take  by mouth., Disp: , Rfl:   •  hydrOXYzine (ATARAX) 25 MG tablet, Take 1 tablet by mouth 3 (Three) Times a Day As Needed for Anxiety., Disp: 90 tablet, Rfl: 1  •  ondansetron ODT (ZOFRAN-ODT) 4 MG disintegrating tablet, , Disp: , Rfl:   •  promethazine (PHENERGAN) 25 MG tablet, , Disp: , Rfl:   •  Ventolin  (90 Base) MCG/ACT inhaler, , Disp: , Rfl:     Allergies   Allergen Reactions   • Amoxicillin GI Intolerance   • Penicillins GI Intolerance       Objective     Physical Exam:  Vital Signs: There were no vitals filed for this visit.  There is no height or weight on file to calculate BMI.     Mental Status Exam:   Hygiene:   good  Cooperation:  Cooperative  Eye Contact:  Good  Psychomotor Behavior:  Appropriate  Affect:  Full range  Mood: normal  Speech:  Normal  Thought Process:  Goal directed  Thought Content:  Normal  Suicidal:   None  Homicidal:  None  Hallucinations:  None  Delusion:  None  Memory:  Intact  Orientation:  Grossly intact  Reliability:  good  Insight:  Good  Judgement:  Good  Impulse Control:  Good  Physical/Medical Issues:  No      Assessment / Plan      Diagnosis:  Diagnoses and all orders for this visit:    1. Generalized anxiety disorder (Primary)    2. Severe episode of recurrent major depressive disorder, without psychotic features (HCC)      Patient presented for follow-up with clinician.  Patient and clinician processed patient frustration with their ex- regarding perceived irresponsibility and disrespectful behavior directed towards the patient and their children.  Clinician utilized active listening, reflective response and shared experience to assist patient and processing frustration.  Patient reported excess predation of anxiety symptoms manifesting itself in the physical symptoms of difficulty breathing.  Patient reported several medical appointments and resulting worry over the condition.  Clinician provided psychoeducation regarding the effect of stress and anxiety and its manifestation and physical symptoms.  Patient and clinician reviewed healthy coping mechanisms designed to alleviate symptoms of anxiety which had aided the patient in past situations.  Patient was an active participant in session.    Progress toward goal: Patient reports slight regression towards long-term goal of emotional regulation and management of anxiety symptoms.    Prognosis: Prognosis for patient is good with the continuation of behavioral health services.    PLAN:  Patient and clinician will continue to process patient stressors regarding family and professional conflict which is currently exacerbating symptoms of anxiety and depression.  A cognitive girl intervention focusing on addressing maladaptive thought patterns will continue to be utilized during and in between sessions.  Follow-ups will occur once every 2 weeks and  will last from 40 to 60 minutes in duration.    1. Safety: No acute safety concerns  2. Risk Assessment: Risk of self-harm acutely is low. Risk of self-harm chronically is also low, but could be further elevated in the event of treatment noncompliance and/or AODA.    Treatment Plan/Goals: Continue supportive psychotherapy efforts and medications as indicated. Treatment and medication options discussed during today's visit. Patient ackowledged and verbally consented to continue with current treatment plan and was educated on the importance of compliance with treatment and follow-up appointments. Patient seems reasonably able to adhere to treatment plan.      Assisted Patient in processing above session content; acknowledged and normalized patient’s thoughts, feelings, and concerns.  Rationalized patient thought process regarding anxiety and depression.      Allowed Patient to freely discuss issues  without interruption or judgement with unconditional positive regard, active listening skills, and empathy. Therapist provided a safe, confidential environment to facilitate the development of a positive therapeutic relationship and encouraged open, honest communication. Assisted Patient in identifying risk factors which would indicate the need for higher level of care including thoughts to harm self or others and/or self-harming behavior and encouraged Patient to contact this office, call 911, or present to the nearest emergency room should any of these events occur. Discussed crisis intervention services and means to access. Patient adamantly and convincingly denies current suicidal or homicidal ideation or perceptual disturbance. Assisted Patient in processing session content; acknowledged and normalized Patient’s thoughts, feelings, and concerns by utilizing a person-centered approach in efforts to build appropriate rapport and a positive therapeutic relationship with open and honest communication. .     Follow Up:    Return in about 2 weeks (around 3/13/2023).    Boaz Pabon, JOSHW

## 2023-03-13 ENCOUNTER — OFFICE VISIT (OUTPATIENT)
Dept: BEHAVIORAL HEALTH | Facility: CLINIC | Age: 33
End: 2023-03-13
Payer: COMMERCIAL

## 2023-03-13 DIAGNOSIS — F33.2 SEVERE EPISODE OF RECURRENT MAJOR DEPRESSIVE DISORDER, WITHOUT PSYCHOTIC FEATURES: ICD-10-CM

## 2023-03-13 DIAGNOSIS — F41.1 GENERALIZED ANXIETY DISORDER: Primary | ICD-10-CM

## 2023-03-13 PROCEDURE — 90837 PSYTX W PT 60 MINUTES: CPT | Performed by: SOCIAL WORKER

## 2023-03-13 NOTE — PROGRESS NOTES
[facility] [place]                 Follow Up Adult      Follow Up Adult Note     Date:2023   Patient Name: Lillie Dos Santos  : 1990   MRN: [id number]   Time IN: 3:31 pm   Time OUT: 4:25 pm    Referring Provider: Ana Paula Duval MD    Chief Complaint:      ICD-10-CM ICD-9-CM   1. Generalized anxiety disorder  F41.1 300.02   2. Severe episode of recurrent major depressive disorder, without psychotic features (HCC)  F33.2 296.33        History of Present Illness:   Lillie Dos Santos is a 32 y.o. female who is being seen today for follow up counseling for anxiety and depression.         Subjective               Patient's Support Network Includes: extended family    Functional Status: Mild impairment       Current Outpatient Medications:   •  budesonide-formoterol (Symbicort) 160-4.5 MCG/ACT inhaler, Inhale 2 puffs 2 (Two) Times a Day for 30 days., Disp: 10.2 g, Rfl: 5  •  busPIRone (BUSPAR) 5 MG tablet, Take 1 tablet by mouth 3 (Three) Times a Day for 30 days., Disp: 90 tablet, Rfl: 0  •  Cetirizine HCl 10 MG capsule, Take  by mouth., Disp: , Rfl:   •  hydrOXYzine (ATARAX) 25 MG tablet, Take 1 tablet by mouth 3 (Three) Times a Day As Needed for Anxiety., Disp: 90 tablet, Rfl: 1  •  ondansetron ODT (ZOFRAN-ODT) 4 MG disintegrating tablet, , Disp: , Rfl:   •  promethazine (PHENERGAN) 25 MG tablet, , Disp: , Rfl:   •  Ventolin  (90 Base) MCG/ACT inhaler, , Disp: , Rfl:     Allergies   Allergen Reactions   • Amoxicillin GI Intolerance   • Penicillins GI Intolerance       Objective     Physical Exam:  Vital Signs: There were no vitals filed for this visit.  There is no height or weight on file to calculate BMI.     Mental Status Exam:   Hygiene:   good  Cooperation:  Cooperative  Eye Contact:  Good  Psychomotor Behavior:  Appropriate  Affect:  Full range  Mood: normal  Speech:  Normal  Thought Process:  Goal directed  Thought Content:  Normal  Suicidal:   None  Homicidal:  None  Hallucinations:  None  Delusion:  None  Memory:  Intact  Orientation:  Grossly intact  Reliability:  good  Insight:  Good  Judgement:  Fair  Impulse Control:  Fair  Physical/Medical Issues:  No      Assessment / Plan      Diagnosis:  Diagnoses and all orders for this visit:    1. Generalized anxiety disorder (Primary)    2. Severe episode of recurrent major depressive disorder, without psychotic features (HCC)    Patient presented for follow-up with clinician.  Patient disclosed that the somatic symptoms reported during the last session were believed to be anxiety induced.  Patient and clinician identified stressors related to recent increase in patient anxiety.  Patient and clinician processed patient frustration and anxiety with the status of their relationship with their children's father.  Clinician provided psychoeducation regarding the recovery process and emotional arrested development.  Clinician provided psychoeducation regarding dependency and enabling behavior often facilitated by the loved ones of an individual abstaining from substance use but still engaging in dysfunctional behavior  that often accompanied use.  Patient identified behavior such as unpaid child support and unsupervised visits with their children as examples of this behavior.  Clinician utilized a strengths based approach to assist patient in recognizing the need for self-advocacy regarding the situation.  Patient was receptive to intervention.  Patient was an active participant in session.    Progress toward goal: Patient continues to make incremental progress towards the identified long-term goal of independent regulation of symptoms of anxiety and depression.    Prognosis: Prognosis for patient is good with continued outpatient therapy.    PLAN:  Patient and clinician will continue to utilize a cognitive behavioral intervention which focuses on identifying maladaptive thought patterns related to low self-esteem  and triggers for anxiety.  Follow-up will occur approximately every 14 days and will last from 40 to 60 minutes in duration    1. Safety: No acute safety concerns  2. Risk Assessment: Risk of self-harm acutely is low. Risk of self-harm chronically is also low, but could be further elevated in the event of treatment noncompliance and/or AODA.    Treatment Plan/Goals: Continue supportive psychotherapy efforts and medications as indicated. Treatment and medication options discussed during today's visit. Patient ackowledged and verbally consented to continue with current treatment plan and was educated on the importance of compliance with treatment and follow-up appointments. Patient seems reasonably able to adhere to treatment plan.      Assisted Patient in processing above session content; acknowledged and normalized patient’s thoughts, feelings, and concerns.  Rationalized patient thought process regarding anxiety and depression.      Allowed Patient to freely discuss issues  without interruption or judgement with unconditional positive regard, active listening skills, and empathy. Therapist provided a safe, confidential environment to facilitate the development of a positive therapeutic relationship and encouraged open, honest communication. Assisted Patient in identifying risk factors which would indicate the need for higher level of care including thoughts to harm self or others and/or self-harming behavior and encouraged Patient to contact this office, call 911, or present to the nearest emergency room should any of these events occur. Discussed crisis intervention services and means to access. Patient adamantly and convincingly denies current suicidal or homicidal ideation or perceptual disturbance. Assisted Patient in processing session content; acknowledged and normalized Patient’s thoughts, feelings, and concerns by utilizing a person-centered approach in efforts to build appropriate rapport and a positive  therapeutic relationship with open and honest communication. .     Follow Up:   Return in about 2 weeks (around 3/27/2023).    Boaz Pabon LCSW

## 2023-03-24 ENCOUNTER — OFFICE VISIT (OUTPATIENT)
Dept: INTERNAL MEDICINE | Facility: CLINIC | Age: 33
End: 2023-03-24
Payer: COMMERCIAL

## 2023-03-24 VITALS
BODY MASS INDEX: 21.16 KG/M2 | HEIGHT: 62 IN | WEIGHT: 115 LBS | TEMPERATURE: 98 F | HEART RATE: 90 BPM | OXYGEN SATURATION: 98 % | DIASTOLIC BLOOD PRESSURE: 66 MMHG | SYSTOLIC BLOOD PRESSURE: 96 MMHG

## 2023-03-24 DIAGNOSIS — F41.9 ANXIETY: ICD-10-CM

## 2023-03-24 DIAGNOSIS — R06.02 SHORTNESS OF BREATH: Primary | ICD-10-CM

## 2023-03-24 PROCEDURE — 1160F RVW MEDS BY RX/DR IN RCRD: CPT | Performed by: INTERNAL MEDICINE

## 2023-03-24 PROCEDURE — 99214 OFFICE O/P EST MOD 30 MIN: CPT | Performed by: INTERNAL MEDICINE

## 2023-03-24 PROCEDURE — 1159F MED LIST DOCD IN RCRD: CPT | Performed by: INTERNAL MEDICINE

## 2023-03-24 RX ORDER — AMOXICILLIN 500 MG/1
CAPSULE ORAL
COMMUNITY
Start: 2023-03-22

## 2023-03-24 RX ORDER — BUSPIRONE HYDROCHLORIDE 10 MG/1
10 TABLET ORAL 3 TIMES DAILY
Qty: 90 TABLET | Refills: 0 | Status: SHIPPED | OUTPATIENT
Start: 2023-03-24 | End: 2023-03-24

## 2023-03-24 RX ORDER — BUSPIRONE HYDROCHLORIDE 10 MG/1
10 TABLET ORAL 3 TIMES DAILY
Qty: 90 TABLET | Refills: 1 | Status: SHIPPED | OUTPATIENT
Start: 2023-03-24

## 2023-03-24 NOTE — PROGRESS NOTES
Internal Medicine Follow Up    Chief Complaint  Lillie Dos Santos is a 32 y.o. female who presents today for follow up of chronic medical conditions outlined below.    Chief Complaint   Patient presents with   • Shortness of Breath     Follow up   • Anxiety   • Asthma        HPI  Ms. Dos Santos comes in today for follow up. Has been to [place] ED within the last 4-6 weeks for SOA and reports negative workup. Then came to this office and was evaluated. CT scan and PFTs ordered. She has these scheduled in early May. Remains on symbicort for asthma. She was questioned about the possibility of anxiety and started on buspar 5mg TID in addition to hydroxyzine. She is taking this and in counseling. She has noted stressors that trigger her SOA and the buspar does help but effect is short lived. She notes that she has tried zoloft and prozac with bad reactions so hesitant to try another SSRI.       Review of Systems  Review of Systems   Constitutional: Negative.    HENT: Positive for congestion and sinus pressure.         Currently treated for sinus infection   Respiratory: Positive for chest tightness and shortness of breath.    Cardiovascular: Negative.    Psychiatric/Behavioral: Positive for stress. Negative for depressed mood. The patient is nervous/anxious.         Current Medications  Current Outpatient Medications on File Prior to Visit   Medication Sig Dispense Refill   • amoxicillin (AMOXIL) 500 MG capsule      • budesonide-formoterol (Symbicort) 160-4.5 MCG/ACT inhaler Inhale 2 puffs 2 (Two) Times a Day for 30 days. 10.2 g 5   • Cetirizine HCl 10 MG capsule Take  by mouth.     • hydrOXYzine (ATARAX) 25 MG tablet Take 1 tablet by mouth 3 (Three) Times a Day As Needed for Anxiety. 90 tablet 1   • ondansetron ODT (ZOFRAN-ODT) 4 MG disintegrating tablet      • promethazine (PHENERGAN) 25 MG tablet      • Ventolin  (90 Base) MCG/ACT inhaler      • [DISCONTINUED] busPIRone (BUSPAR) 5 MG tablet Take 1 tablet by mouth 3  "(Three) Times a Day for 30 days. 90 tablet 0     No current facility-administered medications on file prior to visit.       Allergies  Allergies   Allergen Reactions   • Amoxicillin GI Intolerance   • Penicillins GI Intolerance   • Prozac [Fluoxetine] Other (See Comments)     Weight gain, \"obsessed\" with food   • Zoloft [Sertraline] Other (See Comments)     Panic attacks, insomnia       Objective  Visit Vitals  BP 96/66   Pulse 90   Temp 98 °F (36.7 °C)   Ht 157.5 cm (62\")   Wt 52.2 kg (115 lb)   SpO2 98%   BMI 21.03 kg/m²        Physical Exam  Physical Exam  Vitals and nursing note reviewed.   Constitutional:       General: She is not in acute distress.     Appearance: Normal appearance. She is well-developed and normal weight. She is not ill-appearing or toxic-appearing.   HENT:      Head: Normocephalic and atraumatic.      Nose:      Comments: Sounds very congested  Eyes:      Conjunctiva/sclera: Conjunctivae normal.   Cardiovascular:      Rate and Rhythm: Normal rate and regular rhythm.      Heart sounds: Normal heart sounds. No murmur heard.  Pulmonary:      Effort: Pulmonary effort is normal. No respiratory distress.      Breath sounds: Normal breath sounds. No wheezing, rhonchi or rales.   Skin:     General: Skin is warm and dry.   Neurological:      General: No focal deficit present.      Mental Status: She is alert and oriented to person, place, and time. Mental status is at baseline.   Psychiatric:         Mood and Affect: Mood normal.         Behavior: Behavior normal.         Thought Content: Thought content normal.         Judgment: Judgment normal.         Results  Results for orders placed or performed in visit on 02/27/23   POCT Influenza A/B    Specimen: Swab   Result Value Ref Range    Rapid Influenza A Ag Negative Negative    Rapid Influenza B Ag Negative Negative    Internal Control Passed Passed    Lot Number [id number]     Expiration Date 10/21/2023    POCT SARS-CoV-2 Antigen RIANNA    Specimen: Swab "   Result Value Ref Range    SARS Antigen Not Detected Not Detected, Presumptive Negative    Internal Control Passed Passed    Lot Number [id number]     Expiration Date 05/28/2023         Assessment and Plan  Diagnoses and all orders for this visit:    Shortness of breath  - baseline asthma on symbicort but felt to be symptom of anxiety clinically  - had negative workup in outside ED which is unavailable today  - CT and PFTs pending  - follow up after tests completed    Anxiety  - in counseling and using buspar and hydroxyzine with some benefit  - will increase buspar to 10mg TID  - Has had intolerance to zoloft and prozac. We discussed lexapro or effexor. She will consider these options.  - continue counseling  - return in 6 weeks    Return in about 6 weeks (around 5/5/2023) for Follow up anxiety, asthma, SOA.

## 2023-05-03 ENCOUNTER — HOSPITAL ENCOUNTER (OUTPATIENT)
Dept: CT IMAGING | Facility: HOSPITAL | Age: 33
Discharge: HOME | End: 2023-05-03
Admitting: NURSE PRACTITIONER
Payer: COMMERCIAL

## 2023-05-03 ENCOUNTER — APPOINTMENT (OUTPATIENT)
Dept: PULMONOLOGY | Facility: HOSPITAL | Age: 33
End: 2023-05-03
Payer: COMMERCIAL

## 2023-05-03 DIAGNOSIS — R06.02 SHORTNESS OF BREATH: ICD-10-CM

## 2023-05-03 DIAGNOSIS — J45.20 MILD INTERMITTENT ASTHMA WITHOUT COMPLICATION: ICD-10-CM

## 2023-05-03 PROCEDURE — 71260 CT THORAX DX C+: CPT

## 2023-05-03 PROCEDURE — 25510000001 IOPAMIDOL 61 % SOLUTION: Performed by: NURSE PRACTITIONER

## 2023-05-03 RX ADMIN — IOPAMIDOL 75 ML: 612 INJECTION, SOLUTION INTRAVENOUS at 12:00

## 2023-05-05 ENCOUNTER — TELEPHONE (OUTPATIENT)
Dept: INTERNAL MEDICINE | Facility: CLINIC | Age: 33
End: 2023-05-05
Payer: COMMERCIAL

## 2023-05-05 DIAGNOSIS — B37.31 VAGINAL YEAST INFECTION: ICD-10-CM

## 2023-05-05 DIAGNOSIS — J47.9 FOCAL BRONCHIECTASIS: Primary | ICD-10-CM

## 2023-05-05 RX ORDER — FLUCONAZOLE 150 MG/1
150 TABLET ORAL ONCE
Qty: 1 TABLET | Refills: 0 | Status: SHIPPED | OUTPATIENT
Start: 2023-05-05 | End: 2023-05-05

## 2023-05-05 RX ORDER — AMOXICILLIN AND CLAVULANATE POTASSIUM 875; 125 MG/1; MG/1
1 TABLET, FILM COATED ORAL 2 TIMES DAILY
Qty: 10 TABLET | Refills: 0 | Status: SHIPPED | OUTPATIENT
Start: 2023-05-05 | End: 2023-05-05

## 2023-05-05 RX ORDER — DOXYCYCLINE HYCLATE 100 MG/1
100 CAPSULE ORAL 2 TIMES DAILY
Qty: 10 CAPSULE | Refills: 0 | Status: SHIPPED | OUTPATIENT
Start: 2023-05-05 | End: 2023-05-10

## 2023-05-05 NOTE — TELEPHONE ENCOUNTER
Pharmacy Name: [facility] [id number]  [place]  [address] [facility] - [phone] [facility] [phone]          Pharmacy representative phone number: [phone]    What medication are you calling in regards to: AUGMENTIN GENERIC    What question does the pharmacy have: THE PHARMACY STATES THAT THE PRESCRIPTION SAYS THAT THE IS ALLERGIC TO AMMOXOCILLON AND PENICILLIN THE PHARMACY STATES THAT THEY DON'T SHOW THAT IN THERE SYSTEM BUT THEY NEED TO MAKE SURE THE PATIENT CAN TAKE THE MEDICATION     Who is the provider that prescribed the medication: ADRINNE FORTNER

## 2023-05-18 ENCOUNTER — HOSPITAL ENCOUNTER (OUTPATIENT)
Dept: PULMONOLOGY | Facility: HOSPITAL | Age: 33
Discharge: HOME | End: 2023-05-18
Payer: COMMERCIAL

## 2023-05-18 DIAGNOSIS — J45.20 MILD INTERMITTENT ASTHMA WITHOUT COMPLICATION: ICD-10-CM

## 2023-05-18 DIAGNOSIS — R06.02 SHORTNESS OF BREATH: ICD-10-CM

## 2023-05-18 PROCEDURE — 94726 PLETHYSMOGRAPHY LUNG VOLUMES: CPT

## 2023-05-18 PROCEDURE — 94010 BREATHING CAPACITY TEST: CPT

## 2023-05-18 PROCEDURE — 94729 DIFFUSING CAPACITY: CPT

## 2023-05-19 ENCOUNTER — TELEPHONE (OUTPATIENT)
Dept: INTERNAL MEDICINE | Facility: CLINIC | Age: 33
End: 2023-05-19
Payer: COMMERCIAL

## 2023-05-19 NOTE — TELEPHONE ENCOUNTER
----- Message from JENNIFER Ferguson sent at 5/19/2023  1:22 PM EDT -----  Please let patient know pulmonary function test resulted.  It did not show any obstruction or hyperinflation of the lungs.  Patient does have an upcoming appointment with Dr. Duval in July.  Thank you

## 2023-12-21 ENCOUNTER — LAB (OUTPATIENT)
Dept: LAB | Facility: HOSPITAL | Age: 33
End: 2023-12-21
Payer: COMMERCIAL

## 2023-12-21 ENCOUNTER — OFFICE VISIT (OUTPATIENT)
Dept: INTERNAL MEDICINE | Facility: CLINIC | Age: 33
End: 2023-12-21
Payer: COMMERCIAL

## 2023-12-21 VITALS
BODY MASS INDEX: 22.08 KG/M2 | DIASTOLIC BLOOD PRESSURE: 52 MMHG | TEMPERATURE: 98.3 F | WEIGHT: 120 LBS | HEIGHT: 62 IN | OXYGEN SATURATION: 97 % | HEART RATE: 77 BPM | SYSTOLIC BLOOD PRESSURE: 98 MMHG

## 2023-12-21 DIAGNOSIS — R10.84 GENERALIZED ABDOMINAL PAIN: ICD-10-CM

## 2023-12-21 DIAGNOSIS — R14.3 FLATULENCE: ICD-10-CM

## 2023-12-21 DIAGNOSIS — R14.0 BLOATING: ICD-10-CM

## 2023-12-21 PROCEDURE — 1160F RVW MEDS BY RX/DR IN RCRD: CPT | Performed by: NURSE PRACTITIONER

## 2023-12-21 PROCEDURE — 81001 URINALYSIS AUTO W/SCOPE: CPT

## 2023-12-21 PROCEDURE — 99213 OFFICE O/P EST LOW 20 MIN: CPT | Performed by: NURSE PRACTITIONER

## 2023-12-21 PROCEDURE — 87086 URINE CULTURE/COLONY COUNT: CPT

## 2023-12-21 PROCEDURE — 1159F MED LIST DOCD IN RCRD: CPT | Performed by: NURSE PRACTITIONER

## 2023-12-21 PROCEDURE — 83013 H PYLORI (C-13) BREATH: CPT

## 2023-12-21 RX ORDER — ONDANSETRON 4 MG/1
4 TABLET, FILM COATED ORAL EVERY 8 HOURS PRN
Qty: 30 TABLET | Refills: 0 | Status: SHIPPED | OUTPATIENT
Start: 2023-12-21

## 2023-12-21 NOTE — PROGRESS NOTES
Office Note     Name: Lillie Dos Santos    : 1990     MRN: [id number]     Chief Complaint  Diarrhea (Incontinent bowel movements. ), Abdominal Pain (Sx started Saturday night. Lower abdomen. Feel different than menstrual cramps. ), and Dizziness    Subjective     History of Present Illness:  Lillie Dos Santos is a 33 y.o. female who presents today for acute symptoms    Patient's primary care provider is listed as Dr. Duval.  She was scheduled Dr. Duval previously but it does not appear she has had a recent visit    Patient is here today with abdominal symptoms that started in the early hours of  morning.  She has had some associated nausea, abdominal cramping, flatulence, bloating, generalized abdominal pain.  She states she has a feeling of urgency to have a bowel movement.  Her stools have been solid but there has been changes to color and significant odor.  She denies any symptoms of constipation or excessive diarrhea.  She describes the gas is terrible.  She will have waves of wanting to hold her stomach and put pressure on it.  She has had some associated feeling dizzy and lightheaded.  She has been drinking lots of Gatorade and electrolytes.  She thought she was dehydrated but again this is why she is drinking electrolyte-based fluid.  No blood in the stool.  No recent fevers.  She has had unprotected sex but her cycles have been regular.  Last start date was last Tuesday.  The nausea has improved but the stomach discomfort has not.  She denies any changes to alcohol use or changes to diet over the weekend.  She is unsure of any family history of GI issues      Review of Systems   Constitutional:  Negative for chills, fatigue and fever.   HENT:  Negative for sore throat.    Eyes:  Negative for visual disturbance.   Respiratory:  Negative for cough and shortness of breath.    Cardiovascular:  Negative for chest pain.   Gastrointestinal:  Positive for diarrhea. Negative for abdominal pain.   Skin:   Negative for color change.   Allergic/Immunologic: Negative for immunocompromised state.   Neurological:  Negative for headaches.   Psychiatric/Behavioral:  Negative for behavioral problems.        Past Medical History:   Diagnosis Date    Abnormal Pap smear of cervix     Asthma     Depression 2016     start prozac 10 mg po qd    Esophagitis, reflux     Headache     HSV-2 infection     Hypoglycemia     Proteinuria 2016    Seizures     had 1 or 2 -in teens, unsure of etiology     UTI (urinary tract infection)        Past Surgical History:   Procedure Laterality Date    COLPOSCOPY      after first pregnancy     DENTAL PROCEDURE      FINGER SURGERY Right     forefinger    VAGINAL DELIVERY      WISDOM TOOTH EXTRACTION         Social History     Socioeconomic History    Marital status:      Spouse name: Raffaele Spears     Number of children: 1    Highest education level: Associate degree: occupational, technical, or vocational program   Tobacco Use    Smoking status: Former     Years: 2     Types: Cigarettes     Quit date: 2012     Years since quittin.9    Smokeless tobacco: Never    Tobacco comments:     1 pack every 2 weeks for 2 years during [place]   Vaping Use    Vaping Use: Never used   Substance and Sexual Activity    Alcohol use: Not Currently    Drug use: No    Sexual activity: Yes     Partners: Male     Birth control/protection: None         Current Outpatient Medications:     busPIRone (BUSPAR) 10 MG tablet, Take 1 tablet by mouth 3 (Three) Times a Day., Disp: 90 tablet, Rfl: 1    Cetirizine HCl 10 MG capsule, Take  by mouth., Disp: , Rfl:     hydrOXYzine (ATARAX) 25 MG tablet, Take 1 tablet by mouth 3 (Three) Times a Day As Needed for Anxiety., Disp: 90 tablet, Rfl: 1    ondansetron ODT (ZOFRAN-ODT) 4 MG disintegrating tablet, , Disp: , Rfl:     promethazine (PHENERGAN) 25 MG tablet, , Disp: , Rfl:     Ventolin  (90 Base) MCG/ACT inhaler, , Disp: , Rfl:      "budesonide-formoterol (Symbicort) 160-4.5 MCG/ACT inhaler, Inhale 2 puffs 2 (Two) Times a Day for 30 days., Disp: 10.2 g, Rfl: 5    ondansetron (Zofran) 4 MG tablet, Take 1 tablet by mouth Every 8 (Eight) Hours As Needed for Nausea or Vomiting., Disp: 30 tablet, Rfl: 0    Objective     Vital Signs  BP 98/52   Pulse 77   Temp 98.3 °F (36.8 °C)   Ht 157.5 cm (62.01\")   Wt 54.4 kg (120 lb)   SpO2 97%   BMI 21.94 kg/m²   Estimated body mass index is 21.94 kg/m² as calculated from the following:    Height as of this encounter: 157.5 cm (62.01\").    Weight as of this encounter: 54.4 kg (120 lb).    BMI is within normal parameters. No other follow-up for BMI required.         Physical Exam  Vitals and nursing note reviewed.   Constitutional:       General: She is awake.      Appearance: Normal appearance. She is well-groomed.   HENT:      Head: Normocephalic and atraumatic.   Eyes:      Extraocular Movements: Extraocular movements intact.      Pupils: Pupils are equal, round, and reactive to light.   Cardiovascular:      Rate and Rhythm: Normal rate and regular rhythm.      Heart sounds: Normal heart sounds.   Pulmonary:      Effort: Pulmonary effort is normal.      Breath sounds: Normal breath sounds.   Abdominal:      General: Abdomen is flat. Bowel sounds are normal.      Palpations: Abdomen is soft.      Tenderness: There is generalized abdominal tenderness. There is no guarding or rebound.      Hernia: No hernia is present.      Comments: Negative heel jar   Musculoskeletal:         General: Normal range of motion.   Skin:     General: Skin is warm and dry.   Neurological:      Mental Status: She is alert and oriented to person, place, and time.   Psychiatric:         Mood and Affect: Mood normal.         Behavior: Behavior normal. Behavior is cooperative.                 Assessment and Plan     Diagnoses and all orders for this visit:    1. Generalized abdominal pain  -     H. Pylori Breath Test - Breath, Lung; " Future  -     ondansetron (Zofran) 4 MG tablet; Take 1 tablet by mouth Every 8 (Eight) Hours As Needed for Nausea or Vomiting.  Dispense: 30 tablet; Refill: 0  -     XR Abdomen KUB; Future  -     Urinalysis With Culture If Indicated -; Future    2. Bloating  -     H. Pylori Breath Test - Breath, Lung; Future  -     ondansetron (Zofran) 4 MG tablet; Take 1 tablet by mouth Every 8 (Eight) Hours As Needed for Nausea or Vomiting.  Dispense: 30 tablet; Refill: 0  -     XR Abdomen KUB; Future  -     Urinalysis With Culture If Indicated -; Future    3. Flatulence  -     H. Pylori Breath Test - Breath, Lung; Future  -     ondansetron (Zofran) 4 MG tablet; Take 1 tablet by mouth Every 8 (Eight) Hours As Needed for Nausea or Vomiting.  Dispense: 30 tablet; Refill: 0  -     XR Abdomen KUB; Future  -     Urinalysis With Culture If Indicated -; Future    Plan  Discussed with patient that I will will send to the pharmacy some more Zofran.  X-ray will be ordered and ideally obtained today or tomorrow at her convenience.  She will be notified of results  She will go to the lab and have an H. pylori breath test and urinalysis completed.  Patient's last cycle start date was on Tuesday.  Will hold on a pregnancy test at this time  Stressed and encouraged for patient to continue with water, Pedialyte, Gatorade, bland diet.  She could use Tums or Pepcid as well to assist with her GI symptoms  If she does have any significant changes to symptoms that become worse or severe she does need to go the ER immediately  Plan for sooner follow-up with Dr. Duval    Follow Up  Return for needs sooner follow up with jhoan.    JENNIFER Ferguson    Part of this note may be an electronic transcription/translation of spoken language to printed text using the Dragon Dictation System.

## 2023-12-22 LAB
BACTERIA UR QL AUTO: ABNORMAL /HPF
BILIRUB UR QL STRIP: NEGATIVE
CLARITY UR: ABNORMAL
COLOR UR: YELLOW
GLUCOSE UR STRIP-MCNC: NEGATIVE MG/DL
HGB UR QL STRIP.AUTO: NEGATIVE
HOLD SPECIMEN: NORMAL
HYALINE CASTS UR QL AUTO: ABNORMAL /LPF
KETONES UR QL STRIP: NEGATIVE
LEUKOCYTE ESTERASE UR QL STRIP.AUTO: NEGATIVE
MUCOUS THREADS URNS QL MICRO: ABNORMAL /HPF
NITRITE UR QL STRIP: NEGATIVE
PH UR STRIP.AUTO: 7.5 [PH] (ref 5–8)
PROT UR QL STRIP: ABNORMAL
RBC # UR STRIP: ABNORMAL /HPF
REF LAB TEST METHOD: ABNORMAL
SP GR UR STRIP: 1.03 (ref 1–1.03)
SQUAMOUS #/AREA URNS HPF: ABNORMAL /HPF
UROBILINOGEN UR QL STRIP: ABNORMAL
WBC # UR STRIP: ABNORMAL /HPF

## 2023-12-23 LAB — BACTERIA SPEC AEROBE CULT: NORMAL

## 2023-12-25 LAB — UREA BREATH TEST QL: NEGATIVE

## 2024-01-25 ENCOUNTER — OFFICE VISIT (OUTPATIENT)
Dept: INTERNAL MEDICINE | Facility: CLINIC | Age: 34
End: 2024-01-25
Payer: COMMERCIAL

## 2024-01-25 VITALS
WEIGHT: 119 LBS | DIASTOLIC BLOOD PRESSURE: 68 MMHG | BODY MASS INDEX: 21.9 KG/M2 | SYSTOLIC BLOOD PRESSURE: 110 MMHG | OXYGEN SATURATION: 98 % | RESPIRATION RATE: 18 BRPM | HEART RATE: 80 BPM | HEIGHT: 62 IN

## 2024-01-25 DIAGNOSIS — A60.04 HERPES SIMPLEX VULVOVAGINITIS: ICD-10-CM

## 2024-01-25 DIAGNOSIS — F41.9 ANXIETY: ICD-10-CM

## 2024-01-25 DIAGNOSIS — J45.20 MILD INTERMITTENT ASTHMA WITHOUT COMPLICATION: Primary | ICD-10-CM

## 2024-01-25 PROCEDURE — 99214 OFFICE O/P EST MOD 30 MIN: CPT | Performed by: INTERNAL MEDICINE

## 2024-01-25 RX ORDER — HYDROXYZINE HYDROCHLORIDE 25 MG/1
25 TABLET, FILM COATED ORAL 3 TIMES DAILY PRN
Qty: 90 TABLET | Refills: 1 | Status: SHIPPED | OUTPATIENT
Start: 2024-01-25

## 2024-01-25 RX ORDER — BUDESONIDE AND FORMOTEROL FUMARATE DIHYDRATE 160; 4.5 UG/1; UG/1
2 AEROSOL RESPIRATORY (INHALATION)
Qty: 10.2 G | Refills: 5 | Status: SHIPPED | OUTPATIENT
Start: 2024-01-25 | End: 2024-02-24

## 2024-01-25 RX ORDER — ALBUTEROL SULFATE 90 UG/1
2 AEROSOL, METERED RESPIRATORY (INHALATION) EVERY 6 HOURS PRN
Qty: 18 G | Refills: 6 | Status: SHIPPED | OUTPATIENT
Start: 2024-01-25

## 2024-01-25 RX ORDER — ACYCLOVIR 400 MG/1
400 TABLET ORAL 3 TIMES DAILY
Qty: 90 TABLET | Refills: 0 | Status: SHIPPED | OUTPATIENT
Start: 2024-01-25

## 2024-01-25 NOTE — PROGRESS NOTES
Internal Medicine Follow Up    Chief Complaint  Lillie Dos Santos is a 33 y.o. female who presents today for follow up of chronic medical conditions outlined below.    Chief Complaint   Patient presents with    Anxiety        HPI  Ms. Dos Santos comes in today for follow up. Has missed appointments since she was last here in the 3/2023. She has run out of symbicort and for the last week and a half is using ventolin 1-2 times daily due to cough. She is additionally running low on hydroxyzine. She has not been taking buspar. She notes anxiety and stress are better. She reports a history of genital HSV2. Reports flares are not common but that she is out of acyclovir that she uses PRN. She was in the office in December due to bloating and abdominal pain. This has improved since she has cut out oat milk.     Anxiety  Patient reports no nervous/anxious behavior.            Review of Systems  Review of Systems   Constitutional: Negative.    Respiratory:  Positive for cough.    Gastrointestinal: Negative.    Psychiatric/Behavioral:  Negative for stress. The patient is not nervous/anxious.         Current Medications  Current Outpatient Medications on File Prior to Visit   Medication Sig Dispense Refill    Cetirizine HCl 10 MG capsule Take  by mouth.      ondansetron (Zofran) 4 MG tablet Take 1 tablet by mouth Every 8 (Eight) Hours As Needed for Nausea or Vomiting. 30 tablet 0    promethazine (PHENERGAN) 25 MG tablet Every 6 (Six) Hours As Needed.      [DISCONTINUED] hydrOXYzine (ATARAX) 25 MG tablet Take 1 tablet by mouth 3 (Three) Times a Day As Needed for Anxiety. 90 tablet 1    [DISCONTINUED] Ventolin  (90 Base) MCG/ACT inhaler       [DISCONTINUED] budesonide-formoterol (Symbicort) 160-4.5 MCG/ACT inhaler Inhale 2 puffs 2 (Two) Times a Day for 30 days. 10.2 g 5    [DISCONTINUED] busPIRone (BUSPAR) 10 MG tablet Take 1 tablet by mouth 3 (Three) Times a Day. 90 tablet 1    [DISCONTINUED] ondansetron ODT (ZOFRAN-ODT) 4 MG  "disintegrating tablet        No current facility-administered medications on file prior to visit.       Allergies  Allergies   Allergen Reactions    Amoxicillin GI Intolerance    Penicillins GI Intolerance    Prozac [Fluoxetine] Other (See Comments)     Weight gain, \"obsessed\" with food    Zoloft [Sertraline] Other (See Comments)     Panic attacks, insomnia       Objective  Visit Vitals  /68 (BP Location: Left arm, Patient Position: Sitting, Cuff Size: Adult)   Pulse 80   Resp 18   Ht 157.5 cm (62\")   Wt 54 kg (119 lb)   SpO2 98%   BMI 21.77 kg/m²        Physical Exam  Physical Exam  Vitals and nursing note reviewed.   Constitutional:       General: She is not in acute distress.     Appearance: Normal appearance. She is well-developed and normal weight. She is not ill-appearing or toxic-appearing.   HENT:      Head: Normocephalic and atraumatic.   Eyes:      Conjunctiva/sclera: Conjunctivae normal.   Cardiovascular:      Rate and Rhythm: Normal rate and regular rhythm.      Heart sounds: Normal heart sounds.   Pulmonary:      Effort: Pulmonary effort is normal. No respiratory distress.      Breath sounds: Normal breath sounds. No wheezing, rhonchi or rales.   Skin:     General: Skin is warm and dry.   Neurological:      Mental Status: She is alert and oriented to person, place, and time. Mental status is at baseline.      Gait: Gait normal.   Psychiatric:         Mood and Affect: Mood normal.         Behavior: Behavior normal.         Results  Results for orders placed or performed in visit on 12/21/23   H. Pylori Breath Test - Breath, Lung    Specimen: Lung; Breath   Result Value Ref Range    H. pylori Breath Test Negative Negative   Urine Culture - Urine, Urine, Clean Catch    Specimen: Urine, Clean Catch   Result Value Ref Range    Urine Culture <10,000 CFU/mL Mixed Radha Isolated    Urinalysis With Culture If Indicated - Urine, Clean Catch    Specimen: Urine, Clean Catch   Result Value Ref Range    Color, " UA Yellow Yellow, Straw    Appearance, UA Turbid (A) Clear    pH, UA 7.5 5.0 - 8.0    Specific Gravity, UA 1.026 1.005 - 1.030    Glucose, UA Negative Negative    Ketones, UA Negative Negative    Bilirubin, UA Negative Negative    Blood, UA Negative Negative    Protein,  mg/dL (2+) (A) Negative    Leuk Esterase, UA Negative Negative    Nitrite, UA Negative Negative    Urobilinogen, UA 0.2 E.U./dL 0.2 - 1.0 E.U./dL   [place] Urine Culture Tube - Urine, Clean Catch    Specimen: Urine, Clean Catch   Result Value Ref Range    Extra Tube Hold for add-ons.    Urinalysis, Microscopic Only - Urine, Clean Catch    Specimen: Urine, Clean Catch   Result Value Ref Range    RBC, UA 0-2 None Seen, 0-2 /HPF    WBC, UA 6-10 (A) None Seen, 0-2 /HPF    Bacteria, UA 4+ (A) None Seen /HPF    Squamous Epithelial Cells, UA 13-20 (A) None Seen, 0-2 /HPF    Hyaline Casts, UA 3-6 None Seen /LPF    Mucus, UA Small/1+ (A) None Seen, Trace /HPF    Methodology Manual Light Microscopy         Assessment and Plan  Diagnoses and all orders for this visit:    Mild intermittent asthma without complication  - using ventolin more since running out of symbicort.  - resume symbicort 2 puffs BID. Refills provided of ventolin as well.    Anxiety  - improved, no longer using buspar. Does use hydroxyzine 25mg TID PRN which was refilled today.    Herpes simplex vulvovaginitis  - flares too infrequent for suppressive therapy. Can continue acyclovir 400mg TID x5-10d PRN flare.    Health Maintenance  - Pap smear: GYN at [facility].  - Mammogram: Start screening at age 40.  - Colonoscopy: Start screening at age 45.  - HCV: negative  - Immunizations: tdap 2020. Declined flu, COVID, PCV20 today.  - Depression screening: negative 1/2024    Return in about 3 months (around 4/25/2024) for Annual (any open 30 minutes).

## 2024-05-07 NOTE — TELEPHONE ENCOUNTER
Spoke to Pt and let her know that script was sent in for her Hydroxyzine 25 mg was sent in on 1/17/22. Pt voiced understanding   [FreeTextEntry1] : Here for follow up of known coronary disease

## 2024-12-09 DIAGNOSIS — J45.20 MILD INTERMITTENT ASTHMA WITHOUT COMPLICATION: ICD-10-CM

## 2024-12-09 RX ORDER — BUDESONIDE AND FORMOTEROL FUMARATE DIHYDRATE 160; 4.5 UG/1; UG/1
2 AEROSOL RESPIRATORY (INHALATION) 2 TIMES DAILY
Qty: 30.6 G | OUTPATIENT
Start: 2024-12-09

## 2025-01-22 ENCOUNTER — OFFICE VISIT (OUTPATIENT)
Dept: INTERNAL MEDICINE | Facility: CLINIC | Age: 35
End: 2025-01-22
Payer: COMMERCIAL

## 2025-01-22 VITALS
DIASTOLIC BLOOD PRESSURE: 64 MMHG | BODY MASS INDEX: 23.08 KG/M2 | HEART RATE: 72 BPM | HEIGHT: 62 IN | SYSTOLIC BLOOD PRESSURE: 100 MMHG | WEIGHT: 125.4 LBS | OXYGEN SATURATION: 99 %

## 2025-01-22 DIAGNOSIS — F41.9 ANXIETY: ICD-10-CM

## 2025-01-22 DIAGNOSIS — J45.20 MILD INTERMITTENT ASTHMA WITHOUT COMPLICATION: ICD-10-CM

## 2025-01-22 PROCEDURE — 1126F AMNT PAIN NOTED NONE PRSNT: CPT | Performed by: NURSE PRACTITIONER

## 2025-01-22 PROCEDURE — 99214 OFFICE O/P EST MOD 30 MIN: CPT | Performed by: NURSE PRACTITIONER

## 2025-01-22 RX ORDER — BUDESONIDE AND FORMOTEROL FUMARATE DIHYDRATE 160; 4.5 UG/1; UG/1
2 AEROSOL RESPIRATORY (INHALATION)
Qty: 30.6 G | Refills: 3 | Status: SHIPPED | OUTPATIENT
Start: 2025-01-22

## 2025-01-22 RX ORDER — ALBUTEROL SULFATE 90 UG/1
2 AEROSOL, METERED RESPIRATORY (INHALATION) EVERY 6 HOURS PRN
Qty: 54 G | Refills: 3 | Status: SHIPPED | OUTPATIENT
Start: 2025-01-22

## 2025-01-22 RX ORDER — HYDROXYZINE HYDROCHLORIDE 25 MG/1
25 TABLET, FILM COATED ORAL 3 TIMES DAILY PRN
Qty: 90 TABLET | Refills: 1 | Status: SHIPPED | OUTPATIENT
Start: 2025-01-22

## 2025-01-28 NOTE — PROGRESS NOTES
Office Note     Name: Lillie Dos Santos    : 1990     MRN: [id number]     Chief Complaint  Asthma (Patient reports today for med refills. Patient is in need of her symbicort and hydroxyzine being refilled. Patient states she has no complaints about her current medications ) and Anxiety    Subjective     History of Present Illness:  Lillie Dos Santos is a 34 y.o. female who presents today for discussion of medication refills.  Patient follows with Dr. Duval for chronic conditions.    Patient has a history of asthma.  This has been pretty well-controlled recently.  She does use a Symbicort inhaler twice daily.  She also uses a Ventolin inhaler as needed.  She does need refills on these inhaler sent to her pharmacy.    She also has anxiety.  She takes hydroxyzine as needed should assist with management of anxiety symptoms.  She does need a refill on this medication.  She is tolerating well without side effects.    No further complaints or concerns at this time.  Pleasant visit with the patient today.    Past Medical History:   Diagnosis Date    Abnormal Pap smear of cervix     Asthma     Depression 2016     start prozac 10 mg po qd    Esophagitis, reflux     Headache     HSV-2 infection     Hypoglycemia      (normal spontaneous vaginal delivery)     Proteinuria 2016    Seizures     had 1 or 2 -in teens, unsure of etiology     UTI (urinary tract infection)        Past Surgical History:   Procedure Laterality Date    COLPOSCOPY      after first pregnancy     DENTAL PROCEDURE      FINGER SURGERY Right     forefinger    VAGINAL DELIVERY      WISDOM TOOTH EXTRACTION         Social History     Socioeconomic History    Marital status:      Spouse name: Raffaele Spears     Number of children: 1    Highest education level: Associate degree: occupational, technical, or vocational program   Tobacco Use    Smoking status: Former     Current packs/day: 0.00     Average packs/day: 1 pack/day for 2.0 years  "(2.0 ttl pk-yrs)     Types: Cigarettes     Start date:      Quit date: 2012     Years since quittin.0     Passive exposure: Past    Smokeless tobacco: Never   Vaping Use    Vaping status: Never Used   Substance and Sexual Activity    Alcohol use: Not Currently    Drug use: No    Sexual activity: Yes     Partners: Male     Birth control/protection: None         Current Outpatient Medications:     acyclovir (Zovirax) 400 MG tablet, Take 1 tablet by mouth 3 (Three) Times a Day. Take for 5-10 days as needed for HSV flare., Disp: 90 tablet, Rfl: 0    budesonide-formoterol (Symbicort) 160-4.5 MCG/ACT inhaler, Inhale 2 puffs 2 (Two) Times a Day., Disp: 30.6 g, Rfl: 3    Cetirizine HCl 10 MG capsule, Take  by mouth., Disp: , Rfl:     hydrOXYzine (ATARAX) 25 MG tablet, Take 1 tablet by mouth 3 (Three) Times a Day As Needed for Anxiety., Disp: 90 tablet, Rfl: 1    ondansetron (Zofran) 4 MG tablet, Take 1 tablet by mouth Every 8 (Eight) Hours As Needed for Nausea or Vomiting., Disp: 30 tablet, Rfl: 0    promethazine (PHENERGAN) 25 MG tablet, Every 6 (Six) Hours As Needed., Disp: , Rfl:     Ventolin  (90 Base) MCG/ACT inhaler, Inhale 2 puffs Every 6 (Six) Hours As Needed for Wheezing., Disp: 54 g, Rfl: 3    Objective     Vital Signs  /64   Pulse 72   Ht 157.5 cm (62\")   Wt 56.9 kg (125 lb 6.4 oz)   SpO2 99%   BMI 22.94 kg/m²   Estimated body mass index is 22.94 kg/m² as calculated from the following:    Height as of this encounter: 157.5 cm (62\").    Weight as of this encounter: 56.9 kg (125 lb 6.4 oz).    BMI is within normal parameters. No other follow-up for BMI required.      Physical Exam  Constitutional:       General: She is not in acute distress.     Appearance: Normal appearance. She is not ill-appearing.   HENT:      Head: Normocephalic and atraumatic.      Nose: Nose normal.   Eyes:      Extraocular Movements: Extraocular movements intact.      Conjunctiva/sclera: Conjunctivae normal. "      Pupils: Pupils are equal, round, and reactive to light.   Cardiovascular:      Rate and Rhythm: Normal rate.   Pulmonary:      Effort: Pulmonary effort is normal. No respiratory distress.   Musculoskeletal:         General: Normal range of motion.      Cervical back: Neck supple.   Skin:     General: Skin is warm and dry.   Neurological:      General: No focal deficit present.      Mental Status: She is alert and oriented to person, place, and time. Mental status is at baseline.   Psychiatric:         Mood and Affect: Mood normal.         Behavior: Behavior normal.         Thought Content: Thought content normal.         Judgment: Judgment normal.          Assessment and Plan     Diagnoses and all orders for this visit:    1. Mild intermittent asthma without complication  -     budesonide-formoterol (Symbicort) 160-4.5 MCG/ACT inhaler; Inhale 2 puffs 2 (Two) Times a Day.  Dispense: 30.6 g; Refill: 3  -     Ventolin  (90 Base) MCG/ACT inhaler; Inhale 2 puffs Every 6 (Six) Hours As Needed for Wheezing.  Dispense: 54 g; Refill: 3    2. Anxiety  -     hydrOXYzine (ATARAX) 25 MG tablet; Take 1 tablet by mouth 3 (Three) Times a Day As Needed for Anxiety.  Dispense: 90 tablet; Refill: 1        Follow Up  Return if symptoms worsen or fail to improve, for Follow up with Dr. Duval.    JENNIFER Salter    Part of this note may be an electronic transcription/translation of spoken language to printed text using the Dragon Dictation System.

## 2025-03-26 ENCOUNTER — TELEPHONE (OUTPATIENT)
Dept: INTERNAL MEDICINE | Facility: CLINIC | Age: 35
End: 2025-03-26
Payer: COMMERCIAL

## 2025-03-26 NOTE — TELEPHONE ENCOUNTER
HUB TO READ      LEFT VOICEMAIL LETTING THE PATIENT KNOW THAT FOR HER APPT ON 4-18-25 HER PCP, DR. DE LA CRUZ CAN SEE.    PATIENT CAN BE SCHEDULED THE SAME DAY WITH PCP OR ANOTHER DAY WITH DR. DE LA CRUZ.

## 2025-03-27 NOTE — TELEPHONE ENCOUNTER
HUB TO READ        SPOKE WITH PATIENT TO LET HER KNOW THAT HER PCP (DR. DE LA CRUZ) HAS SOME OPENING THE SAME DAY.  PATIENT STATES THAT SHE IS UNABLE TO MOVE HER APPT.

## 2025-04-11 ENCOUNTER — TELEPHONE (OUTPATIENT)
Dept: INTERNAL MEDICINE | Facility: CLINIC | Age: 35
End: 2025-04-11
Payer: COMMERCIAL

## 2025-04-11 NOTE — TELEPHONE ENCOUNTER
HUB TO READ/RELAY:    LEFT VOICEMAIL ON PATIENT'S PHONE REGARDING HER APPT NEXT FRIDAY 4/18 WITH RONIT JENSEN.  IT ACTUALLY IS SUPPOSED TO BE SCHEDULED WITH HER PCP DR DE LA CRUZ.  WE HAVE NUMEROUS APPOINTMENTS NEXT WEEK TO SCHEDULE.    HAVEN'T CANCELLED ORIGINAL APPT BUT ASKED PT TO CALL BACK TO RESCHEDULE

## 2025-07-17 DIAGNOSIS — J45.20 MILD INTERMITTENT ASTHMA WITHOUT COMPLICATION: ICD-10-CM

## 2025-07-17 RX ORDER — ALBUTEROL SULFATE 90 UG/1
2 AEROSOL, METERED RESPIRATORY (INHALATION) EVERY 6 HOURS PRN
Qty: 54 G | Refills: 3 | OUTPATIENT
Start: 2025-07-17

## 2025-07-17 RX ORDER — BUDESONIDE AND FORMOTEROL FUMARATE DIHYDRATE 160; 4.5 UG/1; UG/1
2 AEROSOL RESPIRATORY (INHALATION)
Qty: 30.6 G | Refills: 3 | OUTPATIENT
Start: 2025-07-17

## 2025-07-17 NOTE — TELEPHONE ENCOUNTER
Caller: Lillie Dos Santos    Relationship: Self    Best call back number: [phone]     Requested Prescriptions:   Requested Prescriptions     Pending Prescriptions Disp Refills    budesonide-formoterol (Symbicort) 160-4.5 MCG/ACT inhaler 30.6 g 3     Sig: Inhale 2 puffs 2 (Two) Times a Day.    Ventolin  (90 Base) MCG/ACT inhaler 54 g 3     Sig: Inhale 2 puffs Every 6 (Six) Hours As Needed for Wheezing.        Pharmacy where request should be sent: [facility] [id number] [phone] [facility] [phone] FX     Last office visit with prescribing clinician: 1/25/2024   Last telemedicine visit with prescribing clinician: Visit date not found   Next office visit with prescribing clinician: Visit date not found     Additional details provided by patient: PATIENT IS OUT OF THE SYMBICORT    Does the patient have less than a 3 day supply:  [x] Yes  [] No    Would you like a call back once the refill request has been completed: [x] Yes [] No    If the office needs to give you a call back, can they leave a voicemail: [x] Yes [] No    Maryanne Myles   07/17/25 09:32 EDT

## 2025-08-28 ENCOUNTER — LAB (OUTPATIENT)
Dept: LAB | Facility: HOSPITAL | Age: 35
End: 2025-08-28
Payer: COMMERCIAL

## 2025-08-28 ENCOUNTER — OFFICE VISIT (OUTPATIENT)
Dept: INTERNAL MEDICINE | Facility: CLINIC | Age: 35
End: 2025-08-28
Payer: COMMERCIAL

## 2025-08-28 VITALS
DIASTOLIC BLOOD PRESSURE: 64 MMHG | HEART RATE: 70 BPM | WEIGHT: 124.2 LBS | HEIGHT: 62 IN | TEMPERATURE: 97.8 F | OXYGEN SATURATION: 99 % | BODY MASS INDEX: 22.86 KG/M2 | SYSTOLIC BLOOD PRESSURE: 98 MMHG

## 2025-08-28 DIAGNOSIS — J30.89 NON-SEASONAL ALLERGIC RHINITIS, UNSPECIFIED TRIGGER: ICD-10-CM

## 2025-08-28 DIAGNOSIS — R80.1 PERSISTENT PROTEINURIA: ICD-10-CM

## 2025-08-28 DIAGNOSIS — Z00.00 ANNUAL PHYSICAL EXAM: Primary | ICD-10-CM

## 2025-08-28 DIAGNOSIS — A60.04 HERPES SIMPLEX VULVOVAGINITIS: ICD-10-CM

## 2025-08-28 DIAGNOSIS — J45.20 MILD INTERMITTENT ASTHMA WITHOUT COMPLICATION: ICD-10-CM

## 2025-08-28 DIAGNOSIS — E55.9 VITAMIN D DEFICIENCY: ICD-10-CM

## 2025-08-28 DIAGNOSIS — Z00.00 ANNUAL PHYSICAL EXAM: ICD-10-CM

## 2025-08-28 DIAGNOSIS — F41.9 ANXIETY: ICD-10-CM

## 2025-08-28 DIAGNOSIS — N89.8 VAGINAL DISCHARGE: ICD-10-CM

## 2025-08-28 LAB
25(OH)D3 SERPL-MCNC: 26.8 NG/ML (ref 30–100)
ALBUMIN SERPL-MCNC: 4.6 G/DL (ref 3.5–5.2)
ALBUMIN/GLOB SERPL: 1.5 G/DL
ALP SERPL-CCNC: 55 U/L (ref 39–117)
ALT SERPL W P-5'-P-CCNC: 6 U/L (ref 1–33)
ANION GAP SERPL CALCULATED.3IONS-SCNC: 12 MMOL/L (ref 5–15)
AST SERPL-CCNC: 14 U/L (ref 1–32)
B-HCG UR QL: NEGATIVE
BILIRUB SERPL-MCNC: 0.4 MG/DL (ref 0–1.2)
BUN SERPL-MCNC: 11 MG/DL (ref 6–20)
BUN/CREAT SERPL: 9.2 (ref 7–25)
CALCIUM SPEC-SCNC: 9.8 MG/DL (ref 8.6–10.5)
CHLORIDE SERPL-SCNC: 103 MMOL/L (ref 98–107)
CHOLEST SERPL-MCNC: 193 MG/DL (ref 0–200)
CO2 SERPL-SCNC: 23 MMOL/L (ref 22–29)
CREAT SERPL-MCNC: 1.2 MG/DL (ref 0.57–1)
DEPRECATED RDW RBC AUTO: 40.6 FL (ref 37–54)
EGFRCR SERPLBLD CKD-EPI 2021: 61.8 ML/MIN/1.73
ERYTHROCYTE [DISTWIDTH] IN BLOOD BY AUTOMATED COUNT: 12.1 % (ref 12.3–15.4)
EXPIRATION DATE: NORMAL
GLOBULIN UR ELPH-MCNC: 3 GM/DL
GLUCOSE SERPL-MCNC: 74 MG/DL (ref 65–99)
HBA1C MFR BLD: 5.1 % (ref 4.8–5.6)
HCT VFR BLD AUTO: 41.6 % (ref 34–46.6)
HDLC SERPL-MCNC: 57 MG/DL (ref 40–60)
HGB BLD-MCNC: 13.4 G/DL (ref 12–15.9)
INTERNAL NEGATIVE CONTROL: NEGATIVE
INTERNAL POSITIVE CONTROL: POSITIVE
LDLC SERPL CALC-MCNC: 126 MG/DL (ref 0–100)
LDLC/HDLC SERPL: 2.19 {RATIO}
Lab: NORMAL
MCH RBC QN AUTO: 29.5 PG (ref 26.6–33)
MCHC RBC AUTO-ENTMCNC: 32.2 G/DL (ref 31.5–35.7)
MCV RBC AUTO: 91.4 FL (ref 79–97)
PLATELET # BLD AUTO: 308 10*3/MM3 (ref 140–450)
PMV BLD AUTO: 9.7 FL (ref 6–12)
POTASSIUM SERPL-SCNC: 4 MMOL/L (ref 3.5–5.2)
PROT SERPL-MCNC: 7.6 G/DL (ref 6–8.5)
RBC # BLD AUTO: 4.55 10*6/MM3 (ref 3.77–5.28)
SODIUM SERPL-SCNC: 138 MMOL/L (ref 136–145)
TRIGL SERPL-MCNC: 56 MG/DL (ref 0–150)
TSH SERPL DL<=0.05 MIU/L-ACNC: 0.77 UIU/ML (ref 0.27–4.2)
VLDLC SERPL-MCNC: 10 MG/DL (ref 5–40)
WBC NRBC COR # BLD AUTO: 7.03 10*3/MM3 (ref 3.4–10.8)

## 2025-08-28 PROCEDURE — 87798 DETECT AGENT NOS DNA AMP: CPT | Performed by: INTERNAL MEDICINE

## 2025-08-28 PROCEDURE — 87591 N.GONORRHOEAE DNA AMP PROB: CPT | Performed by: INTERNAL MEDICINE

## 2025-08-28 PROCEDURE — 81001 URINALYSIS AUTO W/SCOPE: CPT | Performed by: INTERNAL MEDICINE

## 2025-08-28 PROCEDURE — 83036 HEMOGLOBIN GLYCOSYLATED A1C: CPT

## 2025-08-28 PROCEDURE — 84156 ASSAY OF PROTEIN URINE: CPT | Performed by: INTERNAL MEDICINE

## 2025-08-28 PROCEDURE — 87661 TRICHOMONAS VAGINALIS AMPLIF: CPT | Performed by: INTERNAL MEDICINE

## 2025-08-28 PROCEDURE — 87491 CHLMYD TRACH DNA AMP PROBE: CPT | Performed by: INTERNAL MEDICINE

## 2025-08-28 PROCEDURE — 82570 ASSAY OF URINE CREATININE: CPT | Performed by: INTERNAL MEDICINE

## 2025-08-28 PROCEDURE — 82306 VITAMIN D 25 HYDROXY: CPT

## 2025-08-28 PROCEDURE — 80050 GENERAL HEALTH PANEL: CPT

## 2025-08-28 PROCEDURE — 87801 DETECT AGNT MULT DNA AMPLI: CPT | Performed by: INTERNAL MEDICINE

## 2025-08-28 PROCEDURE — 80061 LIPID PANEL: CPT

## 2025-08-28 RX ORDER — HYDROXYZINE HYDROCHLORIDE 25 MG/1
25 TABLET, FILM COATED ORAL 3 TIMES DAILY PRN
Qty: 90 TABLET | Refills: 1 | Status: SHIPPED | OUTPATIENT
Start: 2025-08-28

## 2025-08-28 RX ORDER — ALBUTEROL SULFATE 90 UG/1
2 AEROSOL, METERED RESPIRATORY (INHALATION) EVERY 6 HOURS PRN
Qty: 54 G | Refills: 3 | Status: SHIPPED | OUTPATIENT
Start: 2025-08-28

## 2025-08-28 RX ORDER — BUDESONIDE AND FORMOTEROL FUMARATE DIHYDRATE 160; 4.5 UG/1; UG/1
2 AEROSOL RESPIRATORY (INHALATION)
Qty: 30.6 G | Refills: 11 | Status: SHIPPED | OUTPATIENT
Start: 2025-08-28

## 2025-08-29 LAB
BACTERIA UR QL AUTO: ABNORMAL /HPF
BILIRUB UR QL STRIP: NEGATIVE
CLARITY UR: CLEAR
COLOR UR: YELLOW
CREAT UR-MCNC: 36.4 MG/DL
GLUCOSE UR STRIP-MCNC: NEGATIVE MG/DL
HGB UR QL STRIP.AUTO: NEGATIVE
HYALINE CASTS UR QL AUTO: ABNORMAL /LPF
KETONES UR QL STRIP: NEGATIVE
LEUKOCYTE ESTERASE UR QL STRIP.AUTO: ABNORMAL
NITRITE UR QL STRIP: NEGATIVE
PH UR STRIP.AUTO: 7.5 [PH] (ref 5–8)
PROT ?TM UR-MCNC: 5.6 MG/DL
PROT UR QL STRIP: NEGATIVE
PROT/CREAT UR: 153.8 MG/G CREA (ref 0–200)
RBC # UR STRIP: ABNORMAL /HPF
REF LAB TEST METHOD: ABNORMAL
SP GR UR STRIP: 1.01 (ref 1–1.03)
SQUAMOUS #/AREA URNS HPF: ABNORMAL /HPF
UROBILINOGEN UR QL STRIP: ABNORMAL
WBC # UR STRIP: ABNORMAL /HPF